# Patient Record
Sex: FEMALE | Race: WHITE | HISPANIC OR LATINO | URBAN - METROPOLITAN AREA
[De-identification: names, ages, dates, MRNs, and addresses within clinical notes are randomized per-mention and may not be internally consistent; named-entity substitution may affect disease eponyms.]

---

## 2019-05-10 ENCOUNTER — INPATIENT (INPATIENT)
Facility: HOSPITAL | Age: 39
LOS: 1 days | Discharge: HOME | DRG: 390 | End: 2019-05-12
Attending: SPECIALIST | Admitting: SPECIALIST
Payer: COMMERCIAL

## 2019-05-10 VITALS
HEIGHT: 65 IN | DIASTOLIC BLOOD PRESSURE: 64 MMHG | WEIGHT: 139.99 LBS | SYSTOLIC BLOOD PRESSURE: 100 MMHG | OXYGEN SATURATION: 100 % | RESPIRATION RATE: 18 BRPM | TEMPERATURE: 99 F | HEART RATE: 99 BPM

## 2019-05-10 DIAGNOSIS — Z90.49 ACQUIRED ABSENCE OF OTHER SPECIFIED PARTS OF DIGESTIVE TRACT: Chronic | ICD-10-CM

## 2019-05-10 DIAGNOSIS — K56.609 UNSPECIFIED INTESTINAL OBSTRUCTION, UNSPECIFIED AS TO PARTIAL VERSUS COMPLETE OBSTRUCTION: ICD-10-CM

## 2019-05-10 LAB
ALBUMIN SERPL ELPH-MCNC: 3.5 G/DL — SIGNIFICANT CHANGE UP (ref 3.3–5)
ALP SERPL-CCNC: 63 U/L — SIGNIFICANT CHANGE UP (ref 40–120)
ALT FLD-CCNC: 18 U/L — SIGNIFICANT CHANGE UP (ref 12–78)
ANION GAP SERPL CALC-SCNC: 9 MMOL/L — SIGNIFICANT CHANGE UP (ref 5–17)
AST SERPL-CCNC: 15 U/L — SIGNIFICANT CHANGE UP (ref 15–37)
BASOPHILS # BLD AUTO: 0.03 K/UL — SIGNIFICANT CHANGE UP (ref 0–0.2)
BASOPHILS NFR BLD AUTO: 0.2 % — SIGNIFICANT CHANGE UP (ref 0–2)
BILIRUB SERPL-MCNC: 0.6 MG/DL — SIGNIFICANT CHANGE UP (ref 0.2–1.2)
BUN SERPL-MCNC: 12 MG/DL — SIGNIFICANT CHANGE UP (ref 7–23)
CALCIUM SERPL-MCNC: 8.1 MG/DL — LOW (ref 8.5–10.1)
CHLORIDE SERPL-SCNC: 112 MMOL/L — HIGH (ref 96–108)
CO2 SERPL-SCNC: 20 MMOL/L — LOW (ref 22–31)
CREAT SERPL-MCNC: 0.65 MG/DL — SIGNIFICANT CHANGE UP (ref 0.5–1.3)
EOSINOPHIL # BLD AUTO: 0.08 K/UL — SIGNIFICANT CHANGE UP (ref 0–0.5)
EOSINOPHIL NFR BLD AUTO: 0.6 % — SIGNIFICANT CHANGE UP (ref 0–6)
GLUCOSE SERPL-MCNC: 112 MG/DL — HIGH (ref 70–99)
HCG SERPL-ACNC: <1 MIU/ML — SIGNIFICANT CHANGE UP
HCT VFR BLD CALC: 41.1 % — SIGNIFICANT CHANGE UP (ref 34.5–45)
HGB BLD-MCNC: 13 G/DL — SIGNIFICANT CHANGE UP (ref 11.5–15.5)
IMM GRANULOCYTES NFR BLD AUTO: 0.3 % — SIGNIFICANT CHANGE UP (ref 0–1.5)
LACTATE SERPL-SCNC: 1.4 MMOL/L — SIGNIFICANT CHANGE UP (ref 0.7–2)
LIDOCAIN IGE QN: 93 U/L — SIGNIFICANT CHANGE UP (ref 73–393)
LYMPHOCYTES # BLD AUTO: 0.67 K/UL — LOW (ref 1–3.3)
LYMPHOCYTES # BLD AUTO: 5.1 % — LOW (ref 13–44)
MCHC RBC-ENTMCNC: 26 PG — LOW (ref 27–34)
MCHC RBC-ENTMCNC: 31.6 GM/DL — LOW (ref 32–36)
MCV RBC AUTO: 82.2 FL — SIGNIFICANT CHANGE UP (ref 80–100)
MONOCYTES # BLD AUTO: 0.7 K/UL — SIGNIFICANT CHANGE UP (ref 0–0.9)
MONOCYTES NFR BLD AUTO: 5.3 % — SIGNIFICANT CHANGE UP (ref 2–14)
NEUTROPHILS # BLD AUTO: 11.67 K/UL — HIGH (ref 1.8–7.4)
NEUTROPHILS NFR BLD AUTO: 88.5 % — HIGH (ref 43–77)
NRBC # BLD: 0 /100 WBCS — SIGNIFICANT CHANGE UP (ref 0–0)
PLATELET # BLD AUTO: 438 K/UL — HIGH (ref 150–400)
POTASSIUM SERPL-MCNC: 3.9 MMOL/L — SIGNIFICANT CHANGE UP (ref 3.5–5.3)
POTASSIUM SERPL-SCNC: 3.9 MMOL/L — SIGNIFICANT CHANGE UP (ref 3.5–5.3)
PROT SERPL-MCNC: 7.2 G/DL — SIGNIFICANT CHANGE UP (ref 6–8.3)
RBC # BLD: 5 M/UL — SIGNIFICANT CHANGE UP (ref 3.8–5.2)
RBC # FLD: 14.6 % — HIGH (ref 10.3–14.5)
SODIUM SERPL-SCNC: 141 MMOL/L — SIGNIFICANT CHANGE UP (ref 135–145)
WBC # BLD: 13.19 K/UL — HIGH (ref 3.8–10.5)
WBC # FLD AUTO: 13.19 K/UL — HIGH (ref 3.8–10.5)

## 2019-05-10 PROCEDURE — 71045 X-RAY EXAM CHEST 1 VIEW: CPT | Mod: 26

## 2019-05-10 PROCEDURE — 74176 CT ABD & PELVIS W/O CONTRAST: CPT | Mod: 26

## 2019-05-10 PROCEDURE — 99285 EMERGENCY DEPT VISIT HI MDM: CPT

## 2019-05-10 RX ORDER — ONDANSETRON 8 MG/1
4 TABLET, FILM COATED ORAL ONCE
Refills: 0 | Status: COMPLETED | OUTPATIENT
Start: 2019-05-10 | End: 2019-05-10

## 2019-05-10 RX ORDER — SODIUM CHLORIDE 9 MG/ML
1000 INJECTION INTRAMUSCULAR; INTRAVENOUS; SUBCUTANEOUS
Refills: 0 | Status: DISCONTINUED | OUTPATIENT
Start: 2019-05-10 | End: 2019-05-11

## 2019-05-10 RX ORDER — KETOROLAC TROMETHAMINE 30 MG/ML
30 SYRINGE (ML) INJECTION ONCE
Refills: 0 | Status: DISCONTINUED | OUTPATIENT
Start: 2019-05-10 | End: 2019-05-10

## 2019-05-10 RX ORDER — HYDROMORPHONE HYDROCHLORIDE 2 MG/ML
0.5 INJECTION INTRAMUSCULAR; INTRAVENOUS; SUBCUTANEOUS ONCE
Refills: 0 | Status: DISCONTINUED | OUTPATIENT
Start: 2019-05-10 | End: 2019-05-10

## 2019-05-10 RX ORDER — FAMOTIDINE 10 MG/ML
20 INJECTION INTRAVENOUS ONCE
Refills: 0 | Status: COMPLETED | OUTPATIENT
Start: 2019-05-10 | End: 2019-05-10

## 2019-05-10 RX ORDER — SODIUM CHLORIDE 9 MG/ML
1000 INJECTION INTRAMUSCULAR; INTRAVENOUS; SUBCUTANEOUS ONCE
Refills: 0 | Status: COMPLETED | OUTPATIENT
Start: 2019-05-10 | End: 2019-05-10

## 2019-05-10 RX ORDER — SODIUM CHLORIDE 9 MG/ML
3 INJECTION INTRAMUSCULAR; INTRAVENOUS; SUBCUTANEOUS ONCE
Refills: 0 | Status: COMPLETED | OUTPATIENT
Start: 2019-05-10 | End: 2019-05-10

## 2019-05-10 RX ORDER — KETOROLAC TROMETHAMINE 30 MG/ML
30 SYRINGE (ML) INJECTION EVERY 6 HOURS
Refills: 0 | Status: DISCONTINUED | OUTPATIENT
Start: 2019-05-10 | End: 2019-05-12

## 2019-05-10 RX ADMIN — HYDROMORPHONE HYDROCHLORIDE 0.5 MILLIGRAM(S): 2 INJECTION INTRAMUSCULAR; INTRAVENOUS; SUBCUTANEOUS at 22:00

## 2019-05-10 RX ADMIN — SODIUM CHLORIDE 1000 MILLILITER(S): 9 INJECTION INTRAMUSCULAR; INTRAVENOUS; SUBCUTANEOUS at 22:00

## 2019-05-10 RX ADMIN — HYDROMORPHONE HYDROCHLORIDE 0.5 MILLIGRAM(S): 2 INJECTION INTRAMUSCULAR; INTRAVENOUS; SUBCUTANEOUS at 20:20

## 2019-05-10 RX ADMIN — Medication 30 MILLIGRAM(S): at 23:57

## 2019-05-10 RX ADMIN — FAMOTIDINE 20 MILLIGRAM(S): 10 INJECTION INTRAVENOUS at 20:20

## 2019-05-10 RX ADMIN — SODIUM CHLORIDE 1000 MILLILITER(S): 9 INJECTION INTRAMUSCULAR; INTRAVENOUS; SUBCUTANEOUS at 20:20

## 2019-05-10 RX ADMIN — SODIUM CHLORIDE 3 MILLILITER(S): 9 INJECTION INTRAMUSCULAR; INTRAVENOUS; SUBCUTANEOUS at 20:28

## 2019-05-10 RX ADMIN — ONDANSETRON 4 MILLIGRAM(S): 8 TABLET, FILM COATED ORAL at 20:20

## 2019-05-10 RX ADMIN — HYDROMORPHONE HYDROCHLORIDE 0.5 MILLIGRAM(S): 2 INJECTION INTRAMUSCULAR; INTRAVENOUS; SUBCUTANEOUS at 20:28

## 2019-05-10 RX ADMIN — HYDROMORPHONE HYDROCHLORIDE 0.5 MILLIGRAM(S): 2 INJECTION INTRAMUSCULAR; INTRAVENOUS; SUBCUTANEOUS at 20:45

## 2019-05-10 NOTE — ED ADULT NURSE NOTE - NSIMPLEMENTINTERV_GEN_ALL_ED
Implemented All Universal Safety Interventions:  Shaw to call system. Call bell, personal items and telephone within reach. Instruct patient to call for assistance. Room bathroom lighting operational. Non-slip footwear when patient is off stretcher. Physically safe environment: no spills, clutter or unnecessary equipment. Stretcher in lowest position, wheels locked, appropriate side rails in place.

## 2019-05-10 NOTE — ED PROVIDER NOTE - OBJECTIVE STATEMENT
pt is a 38yo female with no significant pmhx c/o abd pain x today. pt reports epigastric pain radiating to back with n/v and chills. pt reports she did not take anything for symptoms. pt hx of cholecystectomy. pt denies fever, cp, sob

## 2019-05-10 NOTE — ED PROVIDER NOTE - CLINICAL SUMMARY MEDICAL DECISION MAKING FREE TEXT BOX
pt with abd pain + guarding, n/v. will do labs, lactate, ivf, pain control, zofran, ct abd/pelvis to r/o obstruction.

## 2019-05-10 NOTE — ED ADULT NURSE NOTE - OBJECTIVE STATEMENT
Pt reports sudden onset of upper middle abdominal pain ever since eating pasta with sauce. States its a 10/10 pain and has nausea and vomiting.
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
## 2019-05-11 DIAGNOSIS — K56.609 UNSPECIFIED INTESTINAL OBSTRUCTION, UNSPECIFIED AS TO PARTIAL VERSUS COMPLETE OBSTRUCTION: ICD-10-CM

## 2019-05-11 LAB
BASOPHILS # BLD AUTO: 0 K/UL — SIGNIFICANT CHANGE UP (ref 0–0.2)
BASOPHILS NFR BLD AUTO: 0 % — SIGNIFICANT CHANGE UP (ref 0–2)
EOSINOPHIL # BLD AUTO: 0 K/UL — SIGNIFICANT CHANGE UP (ref 0–0.5)
EOSINOPHIL NFR BLD AUTO: 0 % — SIGNIFICANT CHANGE UP (ref 0–6)
HCT VFR BLD CALC: 36.2 % — SIGNIFICANT CHANGE UP (ref 34.5–45)
HGB BLD-MCNC: 11.7 G/DL — SIGNIFICANT CHANGE UP (ref 11.5–15.5)
LYMPHOCYTES # BLD AUTO: 0.49 K/UL — LOW (ref 1–3.3)
LYMPHOCYTES # BLD AUTO: 8 % — LOW (ref 13–44)
MCHC RBC-ENTMCNC: 26.6 PG — LOW (ref 27–34)
MCHC RBC-ENTMCNC: 32.3 GM/DL — SIGNIFICANT CHANGE UP (ref 32–36)
MCV RBC AUTO: 82.3 FL — SIGNIFICANT CHANGE UP (ref 80–100)
MONOCYTES # BLD AUTO: 0.37 K/UL — SIGNIFICANT CHANGE UP (ref 0–0.9)
MONOCYTES NFR BLD AUTO: 6 % — SIGNIFICANT CHANGE UP (ref 2–14)
NEUTROPHILS # BLD AUTO: 5.27 K/UL — SIGNIFICANT CHANGE UP (ref 1.8–7.4)
NEUTROPHILS NFR BLD AUTO: 75 % — SIGNIFICANT CHANGE UP (ref 43–77)
NRBC # BLD: SIGNIFICANT CHANGE UP /100 WBCS (ref 0–0)
PLATELET # BLD AUTO: 395 K/UL — SIGNIFICANT CHANGE UP (ref 150–400)
RBC # BLD: 4.4 M/UL — SIGNIFICANT CHANGE UP (ref 3.8–5.2)
RBC # FLD: 14.7 % — HIGH (ref 10.3–14.5)
WBC # BLD: 6.13 K/UL — SIGNIFICANT CHANGE UP (ref 3.8–10.5)
WBC # FLD AUTO: 6.13 K/UL — SIGNIFICANT CHANGE UP (ref 3.8–10.5)

## 2019-05-11 PROCEDURE — 74019 RADEX ABDOMEN 2 VIEWS: CPT | Mod: 26

## 2019-05-11 RX ORDER — HYDROMORPHONE HYDROCHLORIDE 2 MG/ML
1 INJECTION INTRAMUSCULAR; INTRAVENOUS; SUBCUTANEOUS EVERY 4 HOURS
Refills: 0 | Status: DISCONTINUED | OUTPATIENT
Start: 2019-05-11 | End: 2019-05-11

## 2019-05-11 RX ORDER — ACETAMINOPHEN 500 MG
650 TABLET ORAL ONCE
Refills: 0 | Status: COMPLETED | OUTPATIENT
Start: 2019-05-11 | End: 2019-05-11

## 2019-05-11 RX ORDER — ONDANSETRON 8 MG/1
4 TABLET, FILM COATED ORAL ONCE
Refills: 0 | Status: COMPLETED | OUTPATIENT
Start: 2019-05-11 | End: 2019-05-11

## 2019-05-11 RX ADMIN — Medication 30 MILLIGRAM(S): at 00:21

## 2019-05-11 RX ADMIN — HYDROMORPHONE HYDROCHLORIDE 1 MILLIGRAM(S): 2 INJECTION INTRAMUSCULAR; INTRAVENOUS; SUBCUTANEOUS at 01:52

## 2019-05-11 RX ADMIN — SODIUM CHLORIDE 100 MILLILITER(S): 9 INJECTION INTRAMUSCULAR; INTRAVENOUS; SUBCUTANEOUS at 01:04

## 2019-05-11 RX ADMIN — Medication 30 MILLIGRAM(S): at 22:44

## 2019-05-11 RX ADMIN — Medication 30 MILLIGRAM(S): at 23:00

## 2019-05-11 RX ADMIN — ONDANSETRON 4 MILLIGRAM(S): 8 TABLET, FILM COATED ORAL at 09:21

## 2019-05-11 RX ADMIN — Medication 30 MILLIGRAM(S): at 16:44

## 2019-05-11 RX ADMIN — Medication 650 MILLIGRAM(S): at 22:00

## 2019-05-11 RX ADMIN — Medication 30 MILLIGRAM(S): at 17:10

## 2019-05-11 RX ADMIN — Medication 650 MILLIGRAM(S): at 21:39

## 2019-05-11 RX ADMIN — HYDROMORPHONE HYDROCHLORIDE 1 MILLIGRAM(S): 2 INJECTION INTRAMUSCULAR; INTRAVENOUS; SUBCUTANEOUS at 02:27

## 2019-05-11 RX ADMIN — SODIUM CHLORIDE 100 MILLILITER(S): 9 INJECTION INTRAMUSCULAR; INTRAVENOUS; SUBCUTANEOUS at 11:04

## 2019-05-11 RX ADMIN — ONDANSETRON 4 MILLIGRAM(S): 8 TABLET, FILM COATED ORAL at 02:11

## 2019-05-11 NOTE — PROVIDER CONTACT NOTE (OTHER) - SITUATION
pt states had a loose stool, passing flatus, wants jello or something, getting a headache from hunger
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
## 2019-05-11 NOTE — PROGRESS NOTE ADULT - SUBJECTIVE AND OBJECTIVE BOX
Afeb VSS  Much less pain, now 2/10  Had nausea earlier this AM  No flatus but Abd no longer distended  Abd: mild diffuse tenderness with no guarding  AXR: paucity of gas, gas in colon  P: start clears later today or tomorrow AM if continues to improve and bowel function returns

## 2019-05-11 NOTE — PROVIDER CONTACT NOTE (OTHER) - ACTION/TREATMENT ORDERED:
Md aware coming to assess. No new orders given at this time.
d/c ivf, start clears
one dose of Zofran oral ordered,

## 2019-05-11 NOTE — PATIENT PROFILE ADULT - VISION (WITH CORRECTIVE LENSES IF THE PATIENT USUALLY WEARS THEM):
pt state"  wear glasses  for driving and reading"/Normal vision: sees adequately in most situations; can see medication labels, newsprint

## 2019-05-11 NOTE — H&P ADULT - HISTORY OF PRESENT ILLNESS
Pt is a 39 year old woman who had onset at 7 PM of severe abdominal pain and vomited many times.  At this time she has no pain and nausea but had not vomited for several hours.  She has had same symptoms but milder about once a year for about 10 years.  Never had CT before and has never been hospitalized.  She had an EGD in past and was told it was abnormal but she does not know in what way/  She takes prilosec for GERD

## 2019-05-12 ENCOUNTER — TRANSCRIPTION ENCOUNTER (OUTPATIENT)
Age: 39
End: 2019-05-12

## 2019-05-12 VITALS
DIASTOLIC BLOOD PRESSURE: 68 MMHG | SYSTOLIC BLOOD PRESSURE: 104 MMHG | OXYGEN SATURATION: 100 % | TEMPERATURE: 98 F | HEART RATE: 75 BPM | RESPIRATION RATE: 16 BRPM

## 2019-05-12 PROCEDURE — 96374 THER/PROPH/DIAG INJ IV PUSH: CPT

## 2019-05-12 PROCEDURE — 83690 ASSAY OF LIPASE: CPT

## 2019-05-12 PROCEDURE — 74176 CT ABD & PELVIS W/O CONTRAST: CPT

## 2019-05-12 PROCEDURE — 83605 ASSAY OF LACTIC ACID: CPT

## 2019-05-12 PROCEDURE — 85027 COMPLETE CBC AUTOMATED: CPT

## 2019-05-12 PROCEDURE — 99285 EMERGENCY DEPT VISIT HI MDM: CPT | Mod: 25

## 2019-05-12 PROCEDURE — 36415 COLL VENOUS BLD VENIPUNCTURE: CPT

## 2019-05-12 PROCEDURE — 96375 TX/PRO/DX INJ NEW DRUG ADDON: CPT

## 2019-05-12 PROCEDURE — 84702 CHORIONIC GONADOTROPIN TEST: CPT

## 2019-05-12 PROCEDURE — 96376 TX/PRO/DX INJ SAME DRUG ADON: CPT

## 2019-05-12 PROCEDURE — 71045 X-RAY EXAM CHEST 1 VIEW: CPT

## 2019-05-12 PROCEDURE — 80053 COMPREHEN METABOLIC PANEL: CPT

## 2019-05-12 PROCEDURE — 74019 RADEX ABDOMEN 2 VIEWS: CPT

## 2019-05-12 RX ADMIN — Medication 30 MILLIGRAM(S): at 13:03

## 2019-05-12 RX ADMIN — Medication 30 MILLIGRAM(S): at 14:03

## 2019-05-12 NOTE — PROGRESS NOTE ADULT - SUBJECTIVE AND OBJECTIVE BOX
Afeb VS wnl  Had BM x 2  Tolerating clear liquids  Abd: nontender, nondistended  P: discharge home; will call me if symptoms return

## 2019-05-12 NOTE — DISCHARGE NOTE PROVIDER - CARE PROVIDER_API CALL
Can Jacques)  [employer] [facility]  [address], [address]  [place] [address]  Phone: [phone]  Fax: [phone]  Follow Up Time:

## 2019-05-12 NOTE — DISCHARGE NOTE PROVIDER - NSDCACTIVITY_GEN_ALL_CORE
Stairs allowed/Showering allowed/Walking - Indoors allowed/Sex allowed/Driving allowed/Walking - Outdoors allowed/No restrictions/Return to Work/School allowed/Bathing allowed

## 2020-01-30 ENCOUNTER — APPOINTMENT (OUTPATIENT)
Dept: OBGYN | Facility: CLINIC | Age: 40
End: 2020-01-30
Payer: COMMERCIAL

## 2020-01-30 VITALS
BODY MASS INDEX: 24.16 KG/M2 | SYSTOLIC BLOOD PRESSURE: 128 MMHG | DIASTOLIC BLOOD PRESSURE: 68 MMHG | HEIGHT: 65 IN | WEIGHT: 145 LBS

## 2020-01-30 DIAGNOSIS — Z86.018 PERSONAL HISTORY OF OTHER BENIGN NEOPLASM: ICD-10-CM

## 2020-01-30 DIAGNOSIS — Z01.419 ENCOUNTER FOR GYNECOLOGICAL EXAMINATION (GENERAL) (ROUTINE) W/OUT ABNORMAL FINDINGS: ICD-10-CM

## 2020-01-30 PROCEDURE — 36415 COLL VENOUS BLD VENIPUNCTURE: CPT

## 2020-01-30 PROCEDURE — 99386 PREV VISIT NEW AGE 40-64: CPT

## 2020-01-30 PROCEDURE — 99203 OFFICE O/P NEW LOW 30 MIN: CPT | Mod: 25

## 2020-01-30 RX ORDER — IBUPROFEN 800 MG/1
TABLET, FILM COATED ORAL
Refills: 0 | Status: ACTIVE | COMMUNITY

## 2020-01-30 RX ORDER — CHLORHEXIDINE GLUCONATE 4 %
325 (65 FE) LIQUID (ML) TOPICAL
Refills: 0 | Status: ACTIVE | COMMUNITY

## 2020-01-30 NOTE — CHIEF COMPLAINT
[Initial Visit] : initial GYN visit [FreeTextEntry1] : Pt c/o painful periods - started long time ago but getting worse since last year.  Has pain down legs, takes 500mg ibuprofen - with some relief.  Comes right back.  Periods are very heavy - goes through pants - started last year.  Put herself on iron.  Had a blood count which was low but she doesn’t know what it was.  \par Has tried birth control and thinks she is allergic - gets lightheaded and nauseas. \abbey One of her co workers had novasure ablation and pt is interested in that

## 2020-01-30 NOTE — REVIEW OF SYSTEMS
[Nl] : Integumentary [FreeTextEntry1] : has blood clots on legs family is getting worried about - not bruises - hasn’t seen primary care

## 2020-01-31 LAB
BASOPHILS # BLD AUTO: 0.06 K/UL
BASOPHILS NFR BLD AUTO: 0.7 %
EOSINOPHIL # BLD AUTO: 0.07 K/UL
EOSINOPHIL NFR BLD AUTO: 0.8 %
HCT VFR BLD CALC: 38.4 %
HGB BLD-MCNC: 11.5 G/DL
IMM GRANULOCYTES NFR BLD AUTO: 0.2 %
LYMPHOCYTES # BLD AUTO: 2.66 K/UL
LYMPHOCYTES NFR BLD AUTO: 31.9 %
MAN DIFF?: NORMAL
MCHC RBC-ENTMCNC: 26.2 PG
MCHC RBC-ENTMCNC: 29.9 GM/DL
MCV RBC AUTO: 87.5 FL
MONOCYTES # BLD AUTO: 0.75 K/UL
MONOCYTES NFR BLD AUTO: 9 %
NEUTROPHILS # BLD AUTO: 4.78 K/UL
NEUTROPHILS NFR BLD AUTO: 57.4 %
PLATELET # BLD AUTO: 453 K/UL
RBC # BLD: 4.39 M/UL
RBC # FLD: 16.2 %
T3FREE SERPL-MCNC: 2.9 PG/ML
T4 FREE SERPL-MCNC: 1.1 NG/DL
TSH SERPL-ACNC: 1.56 UIU/ML
WBC # FLD AUTO: 8.34 K/UL

## 2020-02-03 LAB — HPV HIGH+LOW RISK DNA PNL CVX: NOT DETECTED

## 2020-02-06 LAB — CYTOLOGY CVX/VAG DOC THIN PREP: NORMAL

## 2020-02-24 ENCOUNTER — APPOINTMENT (OUTPATIENT)
Dept: OBGYN | Facility: CLINIC | Age: 40
End: 2020-02-24

## 2020-04-25 ENCOUNTER — MESSAGE (OUTPATIENT)
Age: 40
End: 2020-04-25

## 2020-05-16 ENCOUNTER — APPOINTMENT (OUTPATIENT)
Dept: DISASTER EMERGENCY | Facility: CLINIC | Age: 40
End: 2020-05-16

## 2020-05-28 LAB
SARS-COV-2 IGG SERPL IA-ACNC: <0.1 INDEX
SARS-COV-2 IGG SERPL QL IA: NEGATIVE

## 2020-06-10 ENCOUNTER — TRANSCRIPTION ENCOUNTER (OUTPATIENT)
Age: 40
End: 2020-06-10

## 2020-07-10 ENCOUNTER — OUTPATIENT (OUTPATIENT)
Dept: OUTPATIENT SERVICES | Facility: HOSPITAL | Age: 40
LOS: 1 days | End: 2020-07-10
Payer: COMMERCIAL

## 2020-07-10 ENCOUNTER — APPOINTMENT (OUTPATIENT)
Dept: MRI IMAGING | Facility: CLINIC | Age: 40
End: 2020-07-10

## 2020-07-10 DIAGNOSIS — Z00.8 ENCOUNTER FOR OTHER GENERAL EXAMINATION: ICD-10-CM

## 2020-07-10 DIAGNOSIS — Z90.49 ACQUIRED ABSENCE OF OTHER SPECIFIED PARTS OF DIGESTIVE TRACT: Chronic | ICD-10-CM

## 2020-07-10 PROCEDURE — 72141 MRI NECK SPINE W/O DYE: CPT | Mod: 26

## 2020-07-10 PROCEDURE — 72141 MRI NECK SPINE W/O DYE: CPT

## 2020-08-03 ENCOUNTER — APPOINTMENT (OUTPATIENT)
Dept: ORTHOPEDIC SURGERY | Facility: CLINIC | Age: 40
End: 2020-08-03
Payer: COMMERCIAL

## 2020-08-03 VITALS
BODY MASS INDEX: 24.99 KG/M2 | TEMPERATURE: 98 F | SYSTOLIC BLOOD PRESSURE: 120 MMHG | HEIGHT: 65 IN | HEART RATE: 82 BPM | WEIGHT: 150 LBS | DIASTOLIC BLOOD PRESSURE: 77 MMHG

## 2020-08-03 PROCEDURE — 99204 OFFICE O/P NEW MOD 45 MIN: CPT

## 2020-08-04 ENCOUNTER — APPOINTMENT (OUTPATIENT)
Dept: GASTROENTEROLOGY | Facility: CLINIC | Age: 40
End: 2020-08-04
Payer: COMMERCIAL

## 2020-08-11 ENCOUNTER — APPOINTMENT (OUTPATIENT)
Dept: GASTROENTEROLOGY | Facility: CLINIC | Age: 40
End: 2020-08-11
Payer: COMMERCIAL

## 2020-08-11 VITALS
HEART RATE: 88 BPM | BODY MASS INDEX: 24.99 KG/M2 | DIASTOLIC BLOOD PRESSURE: 77 MMHG | HEIGHT: 65 IN | RESPIRATION RATE: 14 BRPM | OXYGEN SATURATION: 97 % | SYSTOLIC BLOOD PRESSURE: 127 MMHG | WEIGHT: 150 LBS

## 2020-08-11 DIAGNOSIS — N92.0 EXCESSIVE AND FREQUENT MENSTRUATION WITH REGULAR CYCLE: ICD-10-CM

## 2020-08-11 DIAGNOSIS — Z78.9 OTHER SPECIFIED HEALTH STATUS: ICD-10-CM

## 2020-08-11 PROCEDURE — 99204 OFFICE O/P NEW MOD 45 MIN: CPT

## 2020-08-12 PROBLEM — N92.0 HEAVY MENSES: Status: ACTIVE | Noted: 2020-01-30

## 2020-08-12 PROBLEM — Z78.9 NON-SMOKER: Status: ACTIVE | Noted: 2020-01-30

## 2020-08-12 NOTE — PHYSICAL EXAM
[General Appearance - Alert] : alert [General Appearance - In No Acute Distress] : in no acute distress [Sclera] : the sclera and conjunctiva were normal [Outer Ear] : the ears and nose were normal in appearance [Oropharynx] : the oropharynx was normal [Neck Appearance] : the appearance of the neck was normal [Neck Cervical Mass (___cm)] : no neck mass was observed [Jugular Venous Distention Increased] : there was no jugular-venous distention [Thyroid Diffuse Enlargement] : the thyroid was not enlarged [Thyroid Nodule] : there were no palpable thyroid nodules [Auscultation Breath Sounds / Voice Sounds] : lungs were clear to auscultation bilaterally [Heart Rate And Rhythm] : heart rate was normal and rhythm regular [Heart Sounds] : normal S1 and S2 [Heart Sounds Gallop] : no gallops [Murmurs] : no murmurs [Heart Sounds Pericardial Friction Rub] : no pericardial rub [Full Pulse] : the pedal pulses are present [Edema] : there was no peripheral edema [Bowel Sounds] : normal bowel sounds [Abdomen Soft] : soft [Abdomen Mass (___ Cm)] : no abdominal mass palpated [Epigastric] : in the epigastric area [Abnormal Walk] : normal gait [Nail Clubbing] : no clubbing  or cyanosis of the fingernails [Musculoskeletal - Swelling] : no joint swelling seen [Motor Tone] : muscle strength and tone were normal [Skin Color & Pigmentation] : normal skin color and pigmentation [Skin Turgor] : normal skin turgor [] : no rash

## 2020-08-12 NOTE — REVIEW OF SYSTEMS
[Feeling Poorly] : feeling poorly [Feeling Tired] : feeling tired [Abdominal Pain] : abdominal pain [Vomiting] : vomiting [Diarrhea] : diarrhea [Arthralgias] : arthralgias [Joint Pain] : joint pain [Negative] : Heme/Lymph
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
## 2020-08-13 LAB
ALBUMIN SERPL ELPH-MCNC: 4.4 G/DL
ALP BLD-CCNC: 60 U/L
ALT SERPL-CCNC: 9 U/L
ANION GAP SERPL CALC-SCNC: 16 MMOL/L
AST SERPL-CCNC: 17 U/L
BASOPHILS # BLD AUTO: 0.06 K/UL
BASOPHILS NFR BLD AUTO: 0.7 %
BILIRUB DIRECT SERPL-MCNC: 0 MG/DL
BILIRUB SERPL-MCNC: <0.2 MG/DL
BUN SERPL-MCNC: 8 MG/DL
CALCIUM SERPL-MCNC: 9.3 MG/DL
CHLORIDE SERPL-SCNC: 105 MMOL/L
CO2 SERPL-SCNC: 18 MMOL/L
CREAT SERPL-MCNC: 0.64 MG/DL
CRP SERPL-MCNC: <0.1 MG/DL
EOSINOPHIL # BLD AUTO: 0.24 K/UL
EOSINOPHIL NFR BLD AUTO: 2.6 %
GLUCOSE SERPL-MCNC: 84 MG/DL
HCT VFR BLD CALC: 39.3 %
HGB BLD-MCNC: 11.5 G/DL
IGA SER QL IEP: 172 MG/DL
IMM GRANULOCYTES NFR BLD AUTO: 0.3 %
LPL SERPL-CCNC: 35 U/L
LYMPHOCYTES # BLD AUTO: 3.09 K/UL
LYMPHOCYTES NFR BLD AUTO: 33.6 %
MAN DIFF?: NORMAL
MCHC RBC-ENTMCNC: 25 PG
MCHC RBC-ENTMCNC: 29.3 GM/DL
MCV RBC AUTO: 85.4 FL
MONOCYTES # BLD AUTO: 0.78 K/UL
MONOCYTES NFR BLD AUTO: 8.5 %
NEUTROPHILS # BLD AUTO: 5.01 K/UL
NEUTROPHILS NFR BLD AUTO: 54.3 %
PLATELET # BLD AUTO: 497 K/UL
POTASSIUM SERPL-SCNC: 4.3 MMOL/L
PROT SERPL-MCNC: 7 G/DL
RBC # BLD: 4.6 M/UL
RBC # FLD: 15.1 %
SODIUM SERPL-SCNC: 139 MMOL/L
TTG IGA SER IA-ACNC: <1.2 U/ML
TTG IGA SER-ACNC: NEGATIVE
WBC # FLD AUTO: 9.21 K/UL

## 2020-08-17 LAB
ENDOMYSIUM IGA SER QL: NEGATIVE
ENDOMYSIUM IGA TITR SER: NORMAL

## 2020-08-19 ENCOUNTER — APPOINTMENT (OUTPATIENT)
Dept: GASTROENTEROLOGY | Facility: AMBULATORY MEDICAL SERVICES | Age: 40
End: 2020-08-19
Payer: COMMERCIAL

## 2020-08-19 ENCOUNTER — RESULT REVIEW (OUTPATIENT)
Age: 40
End: 2020-08-19

## 2020-08-19 PROCEDURE — 45380 COLONOSCOPY AND BIOPSY: CPT | Mod: 59

## 2020-08-19 PROCEDURE — 43239 EGD BIOPSY SINGLE/MULTIPLE: CPT | Mod: 59

## 2020-09-14 ENCOUNTER — APPOINTMENT (OUTPATIENT)
Dept: GASTROENTEROLOGY | Facility: CLINIC | Age: 40
End: 2020-09-14

## 2020-09-29 ENCOUNTER — TRANSCRIPTION ENCOUNTER (OUTPATIENT)
Age: 40
End: 2020-09-29

## 2020-10-08 ENCOUNTER — EMERGENCY (EMERGENCY)
Facility: HOSPITAL | Age: 40
LOS: 1 days | Discharge: HOME | End: 2020-10-08
Attending: EMERGENCY MEDICINE | Admitting: EMERGENCY MEDICINE
Payer: SELF-PAY

## 2020-10-08 VITALS
RESPIRATION RATE: 16 BRPM | TEMPERATURE: 98 F | DIASTOLIC BLOOD PRESSURE: 74 MMHG | OXYGEN SATURATION: 97 % | HEART RATE: 81 BPM | SYSTOLIC BLOOD PRESSURE: 113 MMHG

## 2020-10-08 VITALS
HEIGHT: 65 IN | RESPIRATION RATE: 14 BRPM | WEIGHT: 149.91 LBS | TEMPERATURE: 98 F | OXYGEN SATURATION: 100 % | DIASTOLIC BLOOD PRESSURE: 59 MMHG | HEART RATE: 76 BPM | SYSTOLIC BLOOD PRESSURE: 117 MMHG

## 2020-10-08 DIAGNOSIS — Z90.49 ACQUIRED ABSENCE OF OTHER SPECIFIED PARTS OF DIGESTIVE TRACT: Chronic | ICD-10-CM

## 2020-10-08 LAB — HCG UR QL: NEGATIVE — SIGNIFICANT CHANGE UP

## 2020-10-08 PROCEDURE — 72125 CT NECK SPINE W/O DYE: CPT | Mod: 26

## 2020-10-08 PROCEDURE — 81025 URINE PREGNANCY TEST: CPT

## 2020-10-08 PROCEDURE — 72131 CT LUMBAR SPINE W/O DYE: CPT

## 2020-10-08 PROCEDURE — 72125 CT NECK SPINE W/O DYE: CPT

## 2020-10-08 PROCEDURE — 99284 EMERGENCY DEPT VISIT MOD MDM: CPT | Mod: 25

## 2020-10-08 PROCEDURE — 72040 X-RAY EXAM NECK SPINE 2-3 VW: CPT

## 2020-10-08 PROCEDURE — 70450 CT HEAD/BRAIN W/O DYE: CPT

## 2020-10-08 PROCEDURE — 72040 X-RAY EXAM NECK SPINE 2-3 VW: CPT | Mod: 26

## 2020-10-08 PROCEDURE — 72131 CT LUMBAR SPINE W/O DYE: CPT | Mod: 26

## 2020-10-08 PROCEDURE — 70450 CT HEAD/BRAIN W/O DYE: CPT | Mod: 26

## 2020-10-08 PROCEDURE — 99283 EMERGENCY DEPT VISIT LOW MDM: CPT

## 2020-10-08 PROCEDURE — 72072 X-RAY EXAM THORAC SPINE 3VWS: CPT | Mod: 26

## 2020-10-08 PROCEDURE — 72072 X-RAY EXAM THORAC SPINE 3VWS: CPT

## 2020-10-08 RX ORDER — LIDOCAINE 4 G/100G
1 CREAM TOPICAL ONCE
Refills: 0 | Status: COMPLETED | OUTPATIENT
Start: 2020-10-08 | End: 2020-10-08

## 2020-10-08 RX ORDER — ACETAMINOPHEN 500 MG
650 TABLET ORAL ONCE
Refills: 0 | Status: COMPLETED | OUTPATIENT
Start: 2020-10-08 | End: 2020-10-08

## 2020-10-08 RX ORDER — CYCLOBENZAPRINE HYDROCHLORIDE 10 MG/1
1 TABLET, FILM COATED ORAL
Qty: 9 | Refills: 0
Start: 2020-10-08 | End: 2020-10-10

## 2020-10-08 RX ORDER — CYCLOBENZAPRINE HYDROCHLORIDE 10 MG/1
10 TABLET, FILM COATED ORAL ONCE
Refills: 0 | Status: COMPLETED | OUTPATIENT
Start: 2020-10-08 | End: 2020-10-08

## 2020-10-08 RX ADMIN — Medication 650 MILLIGRAM(S): at 12:59

## 2020-10-08 RX ADMIN — LIDOCAINE 1 PATCH: 4 CREAM TOPICAL at 11:51

## 2020-10-08 RX ADMIN — CYCLOBENZAPRINE HYDROCHLORIDE 10 MILLIGRAM(S): 10 TABLET, FILM COATED ORAL at 13:17

## 2020-10-08 RX ADMIN — Medication 650 MILLIGRAM(S): at 11:51
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
## 2020-10-08 NOTE — ED PROVIDER NOTE - CHPI ED SYMPTOMS NEG
no disorientation/no headache/no dizziness/no bruising/no difficulty bearing weight/no loss of consciousness
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
## 2020-10-08 NOTE — ED PROVIDER NOTE - NSFOLLOWUPINSTRUCTIONS_ED_ALL_ED_FT
follow up with your PMD  follow up with orthopedic  call tomorrow to arrange follow up  recommend over the counter lidocaine patch, tylenol as directed for pain  any concerns, condition worsens return to ED

## 2020-10-08 NOTE — ED PROVIDER NOTE - CARE PROVIDER_API CALL
Volodymyr Ware (DO)  Orthopaedic Surgery  [address] [place] [address]  Phone: [phone]  Fax: [phone]  Follow Up Time: 1-3 Days

## 2020-10-08 NOTE — ED ADULT TRIAGE NOTE - HEIGHT IN INCHES
[FreeTextEntry1] : 52 y/o woman present for 7 day history of respiratory infection which has been improving except for lingering sinus congestion.  5
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
## 2020-10-08 NOTE — ED PROVIDER NOTE - PATIENT PORTAL LINK FT
You can access the FollowMyHealth Patient Portal offered by [facility] by registering at the following website: [email or web address]. By joining [employer]’s FollowMyHealth portal, you will also be able to view your health information using other applications (apps) compatible with our system.

## 2020-10-08 NOTE — ED PROVIDER NOTE - NS CPE EDP MUSC THORACIC LOC
tenderness/left paraspinal thoracic spine pain, muscle spasm with from, nvi, no visible abnormalities

## 2020-10-08 NOTE — ED PROVIDER NOTE - PROGRESS NOTE DETAILS
patient resting comfortably, states feels better after meds,  results discussed,  no fx on ct, given name of ortho Dr Ware, call tomorrow to arrange follow up, rx flexeril sent to pharmacy, recommended otc lidocaine patch as directed and otc tylenol as directed for pain.  copy of results given, advised follow up with pmd, if any concerns return , condition worsens return to ED
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
## 2020-10-21 ENCOUNTER — APPOINTMENT (OUTPATIENT)
Dept: ORTHOPEDIC SURGERY | Facility: CLINIC | Age: 40
End: 2020-10-21
Payer: OTHER MISCELLANEOUS

## 2020-10-21 VITALS
HEART RATE: 96 BPM | WEIGHT: 145 LBS | DIASTOLIC BLOOD PRESSURE: 73 MMHG | BODY MASS INDEX: 24.16 KG/M2 | HEIGHT: 65 IN | TEMPERATURE: 97.7 F | SYSTOLIC BLOOD PRESSURE: 108 MMHG

## 2020-10-21 DIAGNOSIS — M54.2 CERVICALGIA: ICD-10-CM
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
## 2020-10-21 PROCEDURE — 99214 OFFICE O/P EST MOD 30 MIN: CPT

## 2020-12-01 NOTE — ED PROVIDER NOTE - CARDIAC, MLM
Health Maintenance Due   Topic Date Due    Shingles Vaccine (2 of 3) 01/08/2020    Influenza Vaccine (1) 08/01/2020     Updates were requested from care everywhere.  Chart was reviewed for overdue Proactive [facility] Encounters (MICHELLE) topics (CRS, Breast Cancer Screening, Eye exam)  Health Maintenance has been updated.  LINKS immunization registry triggered.  Immunizations were reconciled.      
Normal rate, regular rhythm.  Heart sounds S1, S2.  No murmurs, rubs or gallops.

## 2020-12-23 PROBLEM — Z01.419 ENCOUNTER FOR ANNUAL ROUTINE GYNECOLOGICAL EXAMINATION: Status: RESOLVED | Noted: 2020-01-30 | Resolved: 2020-12-23

## 2021-03-23 ENCOUNTER — RESULT REVIEW (OUTPATIENT)
Age: 41
End: 2021-03-23

## 2021-04-19 ENCOUNTER — APPOINTMENT (OUTPATIENT)
Dept: INTERNAL MEDICINE | Facility: CLINIC | Age: 41
End: 2021-04-19
Payer: COMMERCIAL

## 2021-04-19 DIAGNOSIS — R10.13 EPIGASTRIC PAIN: ICD-10-CM

## 2021-04-19 PROCEDURE — 36415 COLL VENOUS BLD VENIPUNCTURE: CPT

## 2021-04-19 PROCEDURE — 99072 ADDL SUPL MATRL&STAF TM PHE: CPT

## 2021-04-19 PROCEDURE — 99204 OFFICE O/P NEW MOD 45 MIN: CPT | Mod: 25

## 2021-04-19 RX ORDER — FERROUS GLUCONATE 324(37.5)
324 (37.5 FE) TABLET ORAL
Qty: 90 | Refills: 3 | Status: ACTIVE | COMMUNITY
Start: 2021-04-19 | End: 1900-01-01

## 2021-04-19 NOTE — HISTORY OF PRESENT ILLNESS
[FreeTextEntry1] : GI issues for years. . told had celiac- then told crohns disease. She has been hospitalized for stomach pain. recently and had EGD\par  EGD 8/2020- 8mmfundic ulcer\par  and gastritis diffusely.\par  normal colonoscopy\par  Biopsies suggested H. Pylori\par current symptoms- gets bloating and hard abdomen every day and acid inher throat .\par  also gets fatigue \par  She uses pepcid otc helps a little and sometimes 3 times daily\par . she has no appetite-, and gets full quickly. And if she overeats she has pain. \par  recently she was told that she was anemic. she tried taking liquid  burns her stomach. \par Tried prenatals and caused frreqent BM's\par She uses NSAIDS due to pinched nerve in neck-uses 800mg 2-3 x a week. \par

## 2021-04-19 NOTE — PHYSICAL EXAM
[General Appearance - Alert] : alert [General Appearance - In No Acute Distress] : in no acute distress [Outer Ear] : the ears and nose were normal in appearance [Heart Rate And Rhythm] : heart rate was normal and rhythm regular [Oropharynx] : the oropharynx was normal [Heart Sounds] : normal S1 and S2 [Heart Sounds Gallop] : no gallops [Murmurs] : no murmurs [Heart Sounds Pericardial Friction Rub] : no pericardial rub [Bowel Sounds] : normal bowel sounds [Abdomen Soft] : soft [Abdomen Tenderness] : non-tender [] : no hepato-splenomegaly [Abdomen Mass (___ Cm)] : no abdominal mass palpated [External Female Genitalia] : normal external genitalia [Urethral Meatus] : normal urethra [Urinary Bladder Findings] : the bladder was normal on palpation [Cervical Lymph Nodes Enlarged Posterior Bilaterally] : posterior cervical [Cervical Lymph Nodes Enlarged Anterior Bilaterally] : anterior cervical [Supraclavicular Lymph Nodes Enlarged Bilaterally] : supraclavicular [Axillary Lymph Nodes Enlarged Bilaterally] : axillary [Femoral Lymph Nodes Enlarged Bilaterally] : femoral [Inguinal Lymph Nodes Enlarged Bilaterally] : inguinal [No CVA Tenderness] : no ~M costovertebral angle tenderness [No Spinal Tenderness] : no spinal tenderness [Abnormal Walk] : normal gait [Nail Clubbing] : no clubbing  or cyanosis of the fingernails [Musculoskeletal - Swelling] : no joint swelling seen [Motor Tone] : muscle strength and tone were normal [Oriented To Time, Place, And Person] : oriented to person, place, and time [Affect] : the affect was normal [Impaired Insight] : insight and judgment were intact

## 2021-04-21 ENCOUNTER — APPOINTMENT (OUTPATIENT)
Dept: ULTRASOUND IMAGING | Facility: CLINIC | Age: 41
End: 2021-04-21
Payer: COMMERCIAL

## 2021-04-21 ENCOUNTER — APPOINTMENT (OUTPATIENT)
Dept: MAMMOGRAPHY | Facility: CLINIC | Age: 41
End: 2021-04-21
Payer: COMMERCIAL

## 2021-04-21 ENCOUNTER — OUTPATIENT (OUTPATIENT)
Dept: OUTPATIENT SERVICES | Facility: HOSPITAL | Age: 41
LOS: 1 days | End: 2021-04-21
Payer: COMMERCIAL

## 2021-04-21 DIAGNOSIS — N92.0 EXCESSIVE AND FREQUENT MENSTRUATION WITH REGULAR CYCLE: ICD-10-CM

## 2021-04-21 DIAGNOSIS — Z00.8 ENCOUNTER FOR OTHER GENERAL EXAMINATION: ICD-10-CM

## 2021-04-21 DIAGNOSIS — Z12.31 ENCOUNTER FOR SCREENING MAMMOGRAM FOR MALIGNANT NEOPLASM OF BREAST: ICD-10-CM

## 2021-04-21 DIAGNOSIS — Z90.49 ACQUIRED ABSENCE OF OTHER SPECIFIED PARTS OF DIGESTIVE TRACT: Chronic | ICD-10-CM

## 2021-04-21 DIAGNOSIS — D25.0 SUBMUCOUS LEIOMYOMA OF UTERUS: ICD-10-CM

## 2021-04-21 PROCEDURE — 76856 US EXAM PELVIC COMPLETE: CPT | Mod: 26

## 2021-04-21 PROCEDURE — 76641 ULTRASOUND BREAST COMPLETE: CPT | Mod: 26,50

## 2021-04-21 PROCEDURE — 77067 SCR MAMMO BI INCL CAD: CPT

## 2021-04-21 PROCEDURE — 77063 BREAST TOMOSYNTHESIS BI: CPT | Mod: 26

## 2021-04-21 PROCEDURE — 77067 SCR MAMMO BI INCL CAD: CPT | Mod: 26

## 2021-04-21 PROCEDURE — 76641 ULTRASOUND BREAST COMPLETE: CPT

## 2021-04-21 PROCEDURE — 76830 TRANSVAGINAL US NON-OB: CPT | Mod: 26

## 2021-04-21 PROCEDURE — 76856 US EXAM PELVIC COMPLETE: CPT

## 2021-04-21 PROCEDURE — 76830 TRANSVAGINAL US NON-OB: CPT

## 2021-04-21 PROCEDURE — 77063 BREAST TOMOSYNTHESIS BI: CPT

## 2021-04-24 ENCOUNTER — APPOINTMENT (OUTPATIENT)
Dept: ULTRASOUND IMAGING | Facility: CLINIC | Age: 41
End: 2021-04-24

## 2021-04-28 LAB
BASOPHILS # BLD AUTO: 0.06 K/UL
BASOPHILS NFR BLD AUTO: 0.8 %
EOSINOPHIL # BLD AUTO: 0.13 K/UL
EOSINOPHIL NFR BLD AUTO: 1.7 %
FERRITIN SERPL-MCNC: 15 NG/ML
FOLATE SERPL-MCNC: 14.7 NG/ML
HCT VFR BLD CALC: 34.8 %
HGB BLD-MCNC: 10.4 G/DL
IMM GRANULOCYTES NFR BLD AUTO: 0.1 %
IRON SATN MFR SERPL: 5 %
IRON SERPL-MCNC: 21 UG/DL
LYMPHOCYTES # BLD AUTO: 2.4 K/UL
LYMPHOCYTES NFR BLD AUTO: 31.9 %
MAN DIFF?: NORMAL
MCHC RBC-ENTMCNC: 24.5 PG
MCHC RBC-ENTMCNC: 29.9 GM/DL
MCV RBC AUTO: 81.9 FL
MONOCYTES # BLD AUTO: 0.72 K/UL
MONOCYTES NFR BLD AUTO: 9.6 %
NEUTROPHILS # BLD AUTO: 4.2 K/UL
NEUTROPHILS NFR BLD AUTO: 55.9 %
PLATELET # BLD AUTO: 524 K/UL
RBC # BLD: 4.25 M/UL
RBC # FLD: 19.9 %
TIBC SERPL-MCNC: 467 UG/DL
UIBC SERPL-MCNC: 445 UG/DL
UREA BREATH TEST QL: NEGATIVE
VIT B12 SERPL-MCNC: 530 PG/ML
WBC # FLD AUTO: 7.52 K/UL

## 2021-06-07 ENCOUNTER — APPOINTMENT (OUTPATIENT)
Dept: INTERNAL MEDICINE | Facility: AMBULATORY MEDICAL SERVICES | Age: 41
End: 2021-06-07
Payer: COMMERCIAL

## 2021-06-07 PROCEDURE — 43239 EGD BIOPSY SINGLE/MULTIPLE: CPT

## 2021-06-07 RX ORDER — CELECOXIB 100 MG/1
100 CAPSULE ORAL TWICE DAILY
Qty: 30 | Refills: 0 | Status: DISCONTINUED | COMMUNITY
Start: 2020-10-21 | End: 2021-06-07

## 2021-06-07 RX ORDER — BISMUTH SUBCITRATE POTASSIUM, METRONIDAZOLE, AND TETRACYCLINE HYDROCHLORIDE 140; 125; 125 MG/1; MG/1; MG/1
140-125-125 CAPSULE ORAL
Qty: 10 | Refills: 0 | Status: DISCONTINUED | COMMUNITY
Start: 2020-08-21 | End: 2021-06-07

## 2021-07-26 ENCOUNTER — NON-APPOINTMENT (OUTPATIENT)
Age: 41
End: 2021-07-26

## 2021-07-26 ENCOUNTER — APPOINTMENT (OUTPATIENT)
Dept: INTERNAL MEDICINE | Facility: CLINIC | Age: 41
End: 2021-07-26
Payer: COMMERCIAL

## 2021-07-26 VITALS
DIASTOLIC BLOOD PRESSURE: 71 MMHG | HEART RATE: 80 BPM | WEIGHT: 150 LBS | SYSTOLIC BLOOD PRESSURE: 116 MMHG | OXYGEN SATURATION: 98 % | HEIGHT: 65 IN | TEMPERATURE: 97.7 F | BODY MASS INDEX: 24.99 KG/M2

## 2021-07-26 DIAGNOSIS — E53.8 DEFICIENCY OF OTHER SPECIFIED B GROUP VITAMINS: ICD-10-CM

## 2021-07-26 DIAGNOSIS — E55.9 VITAMIN D DEFICIENCY, UNSPECIFIED: ICD-10-CM

## 2021-07-26 DIAGNOSIS — N94.6 DYSMENORRHEA, UNSPECIFIED: ICD-10-CM

## 2021-07-26 DIAGNOSIS — Z00.00 ENCOUNTER FOR GENERAL ADULT MEDICAL EXAMINATION W/OUT ABNORMAL FINDINGS: ICD-10-CM

## 2021-07-26 DIAGNOSIS — E07.9 DISORDER OF THYROID, UNSPECIFIED: ICD-10-CM

## 2021-07-26 DIAGNOSIS — R73.9 HYPERGLYCEMIA, UNSPECIFIED: ICD-10-CM

## 2021-07-26 LAB
BILIRUB UR QL STRIP: NEGATIVE
CLARITY UR: CLEAR
COLLECTION METHOD: NORMAL
GLUCOSE UR-MCNC: NEGATIVE
HCG UR QL: 1 EU/DL
HGB UR QL STRIP.AUTO: NORMAL
KETONES UR-MCNC: NEGATIVE
LEUKOCYTE ESTERASE UR QL STRIP: NORMAL
NITRITE UR QL STRIP: NEGATIVE
PH UR STRIP: 7.5
PROT UR STRIP-MCNC: NORMAL
SP GR UR STRIP: 1.02

## 2021-07-26 PROCEDURE — 36415 COLL VENOUS BLD VENIPUNCTURE: CPT

## 2021-07-26 PROCEDURE — 81003 URINALYSIS AUTO W/O SCOPE: CPT | Mod: QW

## 2021-07-26 PROCEDURE — 99072 ADDL SUPL MATRL&STAF TM PHE: CPT

## 2021-07-26 PROCEDURE — G0444 DEPRESSION SCREEN ANNUAL: CPT | Mod: NC,59

## 2021-07-26 PROCEDURE — G0442 ANNUAL ALCOHOL SCREEN 15 MIN: CPT | Mod: NC,59

## 2021-07-26 PROCEDURE — 99396 PREV VISIT EST AGE 40-64: CPT | Mod: 25

## 2021-07-26 PROCEDURE — 93000 ELECTROCARDIOGRAM COMPLETE: CPT | Mod: 59

## 2021-07-26 RX ORDER — METHOCARBAMOL 500 MG/1
500 TABLET, FILM COATED ORAL 3 TIMES DAILY
Qty: 30 | Refills: 0 | Status: DISCONTINUED | COMMUNITY
Start: 2020-10-21 | End: 2021-07-26

## 2021-07-26 RX ORDER — OMEPRAZOLE 20 MG/1
20 TABLET, DELAYED RELEASE ORAL
Qty: 90 | Refills: 0 | Status: DISCONTINUED | COMMUNITY
Start: 2020-08-19 | End: 2021-07-26

## 2021-07-26 NOTE — HEALTH RISK ASSESSMENT
[Very Good] : ~his/her~  mood as very good [No] : In the past 12 months have you used drugs other than those required for medical reasons? No [No falls in past year] : Patient reported no falls in the past year [0] : 2) Feeling down, depressed, or hopeless: Not at all (0) [Patient refused screening] : Patient refused screening [PHQ-2 Negative - No further assessment needed] : PHQ-2 Negative - No further assessment needed [No Retinopathy] : No retinopathy [Patient reported mammogram was normal] : Patient reported mammogram was normal [Patient reported PAP Smear was normal] : Patient reported PAP Smear was normal [HIV test declined] : HIV test declined normal... [Hepatitis C test declined] : Hepatitis C test declined [With Significant Other] : lives with significant other [Employed] : employed [] :  [# Of Children ___] : has [unfilled] children [Sexually Active] : sexually active [Feels Safe at Home] : Feels safe at home [Fully functional (bathing, dressing, toileting, transferring, walking, feeding)] : Fully functional (bathing, dressing, toileting, transferring, walking, feeding) [Fully functional (using the telephone, shopping, preparing meals, housekeeping, doing laundry, using] : Fully functional and needs no help or supervision to perform IADLs (using the telephone, shopping, preparing meals, housekeeping, doing laundry, using transportation, managing medications and managing finances) [Reports changes in vision] : Reports changes in vision [Smoke Detector] : smoke detector [Carbon Monoxide Detector] : carbon monoxide detector [Seat Belt] :  uses seat belt [Sunscreen] : uses sunscreen [] : No [Audit-CScore] : 0 [UQT6Tavas] : 0 [EyeExamDate] : 2021 [Change in mental status noted] : No change in mental status noted [Reports changes in hearing] : Reports no changes in hearing [Reports normal functional visual acuity (ie: able to read med bottle)] : Reports poor functional visual acuity.  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

## 2021-07-26 NOTE — HISTORY OF PRESENT ILLNESS
[FreeTextEntry1] : Stomach feeling better on pantoprazole.\par Problems falling asleep, uses z-quil.

## 2021-08-12 LAB
25(OH)D3 SERPL-MCNC: 23.8 NG/ML
ALBUMIN SERPL ELPH-MCNC: 4.3 G/DL
ALP BLD-CCNC: 69 U/L
ALT SERPL-CCNC: 7 U/L
ANION GAP SERPL CALC-SCNC: 13 MMOL/L
AST SERPL-CCNC: 12 U/L
BASOPHILS # BLD AUTO: 0.03 K/UL
BASOPHILS NFR BLD AUTO: 0.3 %
BILIRUB SERPL-MCNC: 0.4 MG/DL
BUN SERPL-MCNC: 7 MG/DL
CALCIUM SERPL-MCNC: 9.5 MG/DL
CHLORIDE SERPL-SCNC: 104 MMOL/L
CHOLEST SERPL-MCNC: 145 MG/DL
CO2 SERPL-SCNC: 22 MMOL/L
CREAT SERPL-MCNC: 0.74 MG/DL
EOSINOPHIL # BLD AUTO: 0.19 K/UL
EOSINOPHIL NFR BLD AUTO: 2 %
ESTIMATED AVERAGE GLUCOSE: 91 MG/DL
FERRITIN SERPL-MCNC: 26 NG/ML
GLUCOSE SERPL-MCNC: 87 MG/DL
HBA1C MFR BLD HPLC: 4.8 %
HCT VFR BLD CALC: 38 %
HDLC SERPL-MCNC: 52 MG/DL
HGB BLD-MCNC: 11.5 G/DL
IMM GRANULOCYTES NFR BLD AUTO: 0.5 %
IRON SATN MFR SERPL: 95 %
IRON SERPL-MCNC: 402 UG/DL
LDLC SERPL CALC-MCNC: 69 MG/DL
LYMPHOCYTES # BLD AUTO: 2.2 K/UL
LYMPHOCYTES NFR BLD AUTO: 23.3 %
MAN DIFF?: NORMAL
MCHC RBC-ENTMCNC: 25.9 PG
MCHC RBC-ENTMCNC: 30.3 GM/DL
MCV RBC AUTO: 85.6 FL
MONOCYTES # BLD AUTO: 1.02 K/UL
MONOCYTES NFR BLD AUTO: 10.8 %
NEUTROPHILS # BLD AUTO: 5.96 K/UL
NEUTROPHILS NFR BLD AUTO: 63.1 %
NONHDLC SERPL-MCNC: 94 MG/DL
PLATELET # BLD AUTO: 433 K/UL
POTASSIUM SERPL-SCNC: 4.4 MMOL/L
PROT SERPL-MCNC: 7 G/DL
RBC # BLD: 4.44 M/UL
RBC # FLD: 19.2 %
SODIUM SERPL-SCNC: 140 MMOL/L
T4 FREE SERPL-MCNC: 1.1 NG/DL
T4 SERPL-MCNC: 7 UG/DL
TIBC SERPL-MCNC: 425 UG/DL
TRIGL SERPL-MCNC: 121 MG/DL
TSH SERPL-ACNC: 1.25 UIU/ML
UIBC SERPL-MCNC: 23 UG/DL
VIT B12 SERPL-MCNC: 430 PG/ML
WBC # FLD AUTO: 9.45 K/UL
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
## 2021-10-11 ENCOUNTER — APPOINTMENT (OUTPATIENT)
Dept: INTERNAL MEDICINE | Facility: CLINIC | Age: 41
End: 2021-10-11

## 2021-10-17 ENCOUNTER — TRANSCRIPTION ENCOUNTER (OUTPATIENT)
Age: 41
End: 2021-10-17

## 2021-11-06 ENCOUNTER — TRANSCRIPTION ENCOUNTER (OUTPATIENT)
Age: 41
End: 2021-11-06

## 2022-01-12 ENCOUNTER — APPOINTMENT (OUTPATIENT)
Dept: OBGYN | Facility: CLINIC | Age: 42
End: 2022-01-12
Payer: COMMERCIAL

## 2022-01-12 ENCOUNTER — NON-APPOINTMENT (OUTPATIENT)
Age: 42
End: 2022-01-12

## 2022-01-12 VITALS
DIASTOLIC BLOOD PRESSURE: 70 MMHG | SYSTOLIC BLOOD PRESSURE: 130 MMHG | BODY MASS INDEX: 23.82 KG/M2 | WEIGHT: 143 LBS | HEART RATE: 78 BPM | OXYGEN SATURATION: 98 % | HEIGHT: 65 IN

## 2022-01-12 DIAGNOSIS — R10.2 PELVIC AND PERINEAL PAIN: ICD-10-CM

## 2022-01-12 DIAGNOSIS — Z01.419 ENCOUNTER FOR GYNECOLOGICAL EXAMINATION (GENERAL) (ROUTINE) W/OUT ABNORMAL FINDINGS: ICD-10-CM

## 2022-01-12 DIAGNOSIS — N93.9 ABNORMAL UTERINE AND VAGINAL BLEEDING, UNSPECIFIED: ICD-10-CM

## 2022-01-12 PROCEDURE — 99396 PREV VISIT EST AGE 40-64: CPT

## 2022-01-12 RX ORDER — FERROUS GLUCONATE 324(38)MG
324 (38 FE) TABLET ORAL
Qty: 30 | Refills: 0 | Status: DISCONTINUED | COMMUNITY
Start: 2021-04-19 | End: 2022-01-12

## 2022-01-19 LAB
APPEARANCE: CLEAR
BACTERIA UR CULT: NORMAL
BACTERIA: NEGATIVE
BILIRUBIN URINE: NEGATIVE
BLOOD URINE: NEGATIVE
C TRACH RRNA SPEC QL NAA+PROBE: NOT DETECTED
CANDIDA VAG CYTO: NOT DETECTED
COLOR: NORMAL
CYTOLOGY CVX/VAG DOC THIN PREP: NORMAL
G VAGINALIS+PREV SP MTYP VAG QL MICRO: NOT DETECTED
GLUCOSE QUALITATIVE U: NEGATIVE
HCG UR QL: NEGATIVE
HPV HIGH+LOW RISK DNA PNL CVX: NOT DETECTED
HYALINE CASTS: 0 /LPF
KETONES URINE: NEGATIVE
LEUKOCYTE ESTERASE URINE: NEGATIVE
MICROSCOPIC-UA: NORMAL
N GONORRHOEA RRNA SPEC QL NAA+PROBE: NOT DETECTED
NITRITE URINE: NEGATIVE
PH URINE: 6
PROTEIN URINE: NEGATIVE
QUALITY CONTROL: YES
RED BLOOD CELLS URINE: 2 /HPF
SOURCE AMPLIFICATION: NORMAL
SPECIFIC GRAVITY URINE: 1.02
SQUAMOUS EPITHELIAL CELLS: 3 /HPF
T VAGINALIS VAG QL WET PREP: NOT DETECTED
UROBILINOGEN URINE: NORMAL
WHITE BLOOD CELLS URINE: 1 /HPF

## 2022-01-19 NOTE — PLAN
[FreeTextEntry1] : \par \par I spent the time noted on the day of this patient encounter preparing for, providing and documenting the above E/M service and counseling and educate patient on differential, workup, disease course, and treatment/management. Education was provided to the patient during this encounter. All questions and concerns were answered and addressed in detail.\par \par Olga Huang MD\par
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
## 2022-01-31 ENCOUNTER — APPOINTMENT (OUTPATIENT)
Dept: ULTRASOUND IMAGING | Facility: CLINIC | Age: 42
End: 2022-01-31

## 2022-04-26 ENCOUNTER — RESULT REVIEW (OUTPATIENT)
Age: 42
End: 2022-04-26

## 2022-04-26 ENCOUNTER — APPOINTMENT (OUTPATIENT)
Dept: ULTRASOUND IMAGING | Facility: CLINIC | Age: 42
End: 2022-04-26
Payer: COMMERCIAL

## 2022-04-26 ENCOUNTER — OUTPATIENT (OUTPATIENT)
Dept: OUTPATIENT SERVICES | Facility: HOSPITAL | Age: 42
LOS: 1 days | End: 2022-04-26
Payer: COMMERCIAL

## 2022-04-26 ENCOUNTER — APPOINTMENT (OUTPATIENT)
Dept: MAMMOGRAPHY | Facility: CLINIC | Age: 42
End: 2022-04-26
Payer: COMMERCIAL

## 2022-04-26 DIAGNOSIS — Z01.419 ENCOUNTER FOR GYNECOLOGICAL EXAMINATION (GENERAL) (ROUTINE) WITHOUT ABNORMAL FINDINGS: ICD-10-CM

## 2022-04-26 DIAGNOSIS — N93.9 ABNORMAL UTERINE AND VAGINAL BLEEDING, UNSPECIFIED: ICD-10-CM

## 2022-04-26 DIAGNOSIS — Z00.8 ENCOUNTER FOR OTHER GENERAL EXAMINATION: ICD-10-CM

## 2022-04-26 DIAGNOSIS — Z90.49 ACQUIRED ABSENCE OF OTHER SPECIFIED PARTS OF DIGESTIVE TRACT: Chronic | ICD-10-CM

## 2022-04-26 PROCEDURE — 76641 ULTRASOUND BREAST COMPLETE: CPT | Mod: 26,50

## 2022-04-26 PROCEDURE — 77067 SCR MAMMO BI INCL CAD: CPT | Mod: 26

## 2022-04-26 PROCEDURE — 77063 BREAST TOMOSYNTHESIS BI: CPT | Mod: 26

## 2022-04-26 PROCEDURE — 77067 SCR MAMMO BI INCL CAD: CPT

## 2022-04-26 PROCEDURE — 76856 US EXAM PELVIC COMPLETE: CPT | Mod: 26

## 2022-04-26 PROCEDURE — 77063 BREAST TOMOSYNTHESIS BI: CPT

## 2022-04-26 PROCEDURE — 76830 TRANSVAGINAL US NON-OB: CPT | Mod: 26

## 2022-04-26 PROCEDURE — 76641 ULTRASOUND BREAST COMPLETE: CPT

## 2022-04-26 PROCEDURE — 76856 US EXAM PELVIC COMPLETE: CPT

## 2022-04-26 PROCEDURE — 76830 TRANSVAGINAL US NON-OB: CPT

## 2022-05-02 ENCOUNTER — RESULT REVIEW (OUTPATIENT)
Age: 42
End: 2022-05-02

## 2022-05-02 ENCOUNTER — APPOINTMENT (OUTPATIENT)
Dept: ULTRASOUND IMAGING | Facility: CLINIC | Age: 42
End: 2022-05-02
Payer: COMMERCIAL

## 2022-05-02 ENCOUNTER — APPOINTMENT (OUTPATIENT)
Dept: MAMMOGRAPHY | Facility: CLINIC | Age: 42
End: 2022-05-02
Payer: COMMERCIAL

## 2022-05-02 ENCOUNTER — OUTPATIENT (OUTPATIENT)
Dept: OUTPATIENT SERVICES | Facility: HOSPITAL | Age: 42
LOS: 1 days | End: 2022-05-02
Payer: COMMERCIAL

## 2022-05-02 DIAGNOSIS — Z90.49 ACQUIRED ABSENCE OF OTHER SPECIFIED PARTS OF DIGESTIVE TRACT: Chronic | ICD-10-CM

## 2022-05-02 DIAGNOSIS — Z00.8 ENCOUNTER FOR OTHER GENERAL EXAMINATION: ICD-10-CM

## 2022-05-02 PROCEDURE — G0279: CPT | Mod: 26

## 2022-05-02 PROCEDURE — G0279: CPT

## 2022-05-02 PROCEDURE — 77065 DX MAMMO INCL CAD UNI: CPT | Mod: 26,RT

## 2022-05-02 PROCEDURE — 77065 DX MAMMO INCL CAD UNI: CPT

## 2022-05-02 PROCEDURE — 76642 ULTRASOUND BREAST LIMITED: CPT

## 2022-05-02 PROCEDURE — 76642 ULTRASOUND BREAST LIMITED: CPT | Mod: 26,RT

## 2022-05-03 ENCOUNTER — APPOINTMENT (OUTPATIENT)
Dept: OBGYN | Facility: CLINIC | Age: 42
End: 2022-05-03
Payer: COMMERCIAL

## 2022-05-03 VITALS
BODY MASS INDEX: 23.82 KG/M2 | HEIGHT: 65 IN | OXYGEN SATURATION: 98 % | DIASTOLIC BLOOD PRESSURE: 60 MMHG | HEART RATE: 83 BPM | WEIGHT: 143 LBS | SYSTOLIC BLOOD PRESSURE: 124 MMHG | TEMPERATURE: 98 F

## 2022-05-03 DIAGNOSIS — Z87.42 PERSONAL HISTORY OF OTHER DISEASES OF THE FEMALE GENITAL TRACT: ICD-10-CM

## 2022-05-03 PROCEDURE — 99214 OFFICE O/P EST MOD 30 MIN: CPT

## 2022-05-05 LAB
ESTRADIOL SERPL-MCNC: 355 PG/ML
HCG SERPL-MCNC: <1 MIU/ML
PROLACTIN SERPL-MCNC: 33.1 NG/ML
PROLACTIN SERPL-MCNC: 33.3 NG/ML
TSH SERPL-ACNC: 1.38 UIU/ML

## 2022-05-09 NOTE — DISCHARGE NOTE NURSING/CASE MANAGEMENT/SOCIAL WORK - NSDCDPATPORTLINK_GEN_ALL_CORE
You can access the [facility] Patient Portal, offered by [facility], by registering with the following website: [email or web address] show

## 2022-05-10 ENCOUNTER — OUTPATIENT (OUTPATIENT)
Dept: OUTPATIENT SERVICES | Facility: HOSPITAL | Age: 42
LOS: 1 days | End: 2022-05-10
Payer: COMMERCIAL

## 2022-05-10 ENCOUNTER — APPOINTMENT (OUTPATIENT)
Dept: MRI IMAGING | Facility: CLINIC | Age: 42
End: 2022-05-10
Payer: COMMERCIAL

## 2022-05-10 DIAGNOSIS — Z90.49 ACQUIRED ABSENCE OF OTHER SPECIFIED PARTS OF DIGESTIVE TRACT: Chronic | ICD-10-CM

## 2022-05-10 DIAGNOSIS — R79.89 OTHER SPECIFIED ABNORMAL FINDINGS OF BLOOD CHEMISTRY: ICD-10-CM

## 2022-05-10 DIAGNOSIS — Z00.8 ENCOUNTER FOR OTHER GENERAL EXAMINATION: ICD-10-CM

## 2022-05-10 PROBLEM — Z87.42 H/O: MENORRHAGIA: Status: ACTIVE | Noted: 2022-05-10

## 2022-05-10 LAB — FSH: 14 MIU/ML

## 2022-05-10 PROCEDURE — 70553 MRI BRAIN STEM W/O & W/DYE: CPT | Mod: 26

## 2022-05-10 PROCEDURE — 70553 MRI BRAIN STEM W/O & W/DYE: CPT

## 2022-05-10 PROCEDURE — A9585: CPT

## 2022-05-10 NOTE — PLAN
[FreeTextEntry1] : \par \par We discussed the risks, benefits and alternatives of the procedure including, but not limited to: pain, bleeding, infection, risk of damage to the uterus, uterine perforation, chance of diagnostic laparoscopy or laparotomy, risk of damage of structures surrounding the uterus including but not limited to bowel, bladder ureters, nerves, and vessels. The chance of deep vein thromboses was also discussed. The patient expressed understanding of the risks, benefits and alternatives of the procedure and was able to explain them in her own words.\par \par I spent the time noted on the day of this patient encounter preparing for, providing and documenting the above E/M service and counseling and educate patient on differential, workup, disease course, and treatment/management. Education was provided to the patient during this encounter. All questions and concerns were answered and addressed in detail.\par \par Olga Huang MD\par

## 2022-05-10 NOTE — HISTORY OF PRESENT ILLNESS
[FreeTextEntry1] : 43 yo elevated PRL\par still heavy menses\par \par Wants HSC, D+C ablation \par June 20th--schedule

## 2022-05-11 ENCOUNTER — OUTPATIENT (OUTPATIENT)
Dept: OUTPATIENT SERVICES | Facility: HOSPITAL | Age: 42
LOS: 1 days | End: 2022-05-11
Payer: COMMERCIAL

## 2022-05-11 VITALS
WEIGHT: 147.05 LBS | RESPIRATION RATE: 18 BRPM | TEMPERATURE: 98 F | OXYGEN SATURATION: 100 % | HEART RATE: 71 BPM | HEIGHT: 65.5 IN | DIASTOLIC BLOOD PRESSURE: 71 MMHG | SYSTOLIC BLOOD PRESSURE: 102 MMHG

## 2022-05-11 DIAGNOSIS — Z01.818 ENCOUNTER FOR OTHER PREPROCEDURAL EXAMINATION: ICD-10-CM

## 2022-05-11 DIAGNOSIS — M53.85 OTHER SPECIFIED DORSOPATHIES, THORACOLUMBAR REGION: ICD-10-CM

## 2022-05-11 DIAGNOSIS — K21.9 GASTRO-ESOPHAGEAL REFLUX DISEASE WITHOUT ESOPHAGITIS: ICD-10-CM

## 2022-05-11 DIAGNOSIS — Z90.49 ACQUIRED ABSENCE OF OTHER SPECIFIED PARTS OF DIGESTIVE TRACT: Chronic | ICD-10-CM

## 2022-05-11 DIAGNOSIS — Z98.890 OTHER SPECIFIED POSTPROCEDURAL STATES: Chronic | ICD-10-CM

## 2022-05-11 DIAGNOSIS — D64.9 ANEMIA, UNSPECIFIED: ICD-10-CM

## 2022-05-11 DIAGNOSIS — M53.82 OTHER SPECIFIED DORSOPATHIES, CERVICAL REGION: ICD-10-CM

## 2022-05-11 DIAGNOSIS — N65.0 DEFORMITY OF RECONSTRUCTED BREAST: ICD-10-CM

## 2022-05-11 LAB
ANION GAP SERPL CALC-SCNC: 12 MMOL/L — SIGNIFICANT CHANGE UP (ref 5–17)
BUN SERPL-MCNC: 10 MG/DL — SIGNIFICANT CHANGE UP (ref 7–23)
CALCIUM SERPL-MCNC: 9.2 MG/DL — SIGNIFICANT CHANGE UP (ref 8.4–10.5)
CHLORIDE SERPL-SCNC: 106 MMOL/L — SIGNIFICANT CHANGE UP (ref 96–108)
CO2 SERPL-SCNC: 21 MMOL/L — LOW (ref 22–31)
CREAT SERPL-MCNC: 0.61 MG/DL — SIGNIFICANT CHANGE UP (ref 0.5–1.3)
EGFR: 113 ML/MIN/1.73M2 — SIGNIFICANT CHANGE UP
GLUCOSE SERPL-MCNC: 89 MG/DL — SIGNIFICANT CHANGE UP (ref 70–99)
HCT VFR BLD CALC: 36.9 % — SIGNIFICANT CHANGE UP (ref 34.5–45)
HGB BLD-MCNC: 11.3 G/DL — LOW (ref 11.5–15.5)
MCHC RBC-ENTMCNC: 25.7 PG — LOW (ref 27–34)
MCHC RBC-ENTMCNC: 30.6 GM/DL — LOW (ref 32–36)
MCV RBC AUTO: 84.1 FL — SIGNIFICANT CHANGE UP (ref 80–100)
NRBC # BLD: 0 /100 WBCS — SIGNIFICANT CHANGE UP (ref 0–0)
PLATELET # BLD AUTO: 404 K/UL — HIGH (ref 150–400)
POTASSIUM SERPL-MCNC: 4.1 MMOL/L — SIGNIFICANT CHANGE UP (ref 3.5–5.3)
POTASSIUM SERPL-SCNC: 4.1 MMOL/L — SIGNIFICANT CHANGE UP (ref 3.5–5.3)
RBC # BLD: 4.39 M/UL — SIGNIFICANT CHANGE UP (ref 3.8–5.2)
RBC # FLD: 17.4 % — HIGH (ref 10.3–14.5)
SODIUM SERPL-SCNC: 139 MMOL/L — SIGNIFICANT CHANGE UP (ref 135–145)
WBC # BLD: 11 K/UL — HIGH (ref 3.8–10.5)
WBC # FLD AUTO: 11 K/UL — HIGH (ref 3.8–10.5)

## 2022-05-11 PROCEDURE — 80048 BASIC METABOLIC PNL TOTAL CA: CPT

## 2022-05-11 PROCEDURE — G0463: CPT

## 2022-05-11 PROCEDURE — 85027 COMPLETE CBC AUTOMATED: CPT

## 2022-05-11 RX ORDER — SODIUM CHLORIDE 9 MG/ML
1000 INJECTION, SOLUTION INTRAVENOUS
Refills: 0 | Status: DISCONTINUED | OUTPATIENT
Start: 2022-05-27 | End: 2022-06-11

## 2022-05-11 NOTE — H&P PST ADULT - HISTORY OF PRESENT ILLNESS
42 year old female presents accompanied with her daughter, for preop testing for scheduled bilateral breast reduction. Her medical history significant for GERD, elevated prolactin level (been monitored x 1 year), iron deficiency anemia, uterine leiomyoma.   Patient denies any recent travel and no known exposure to covid-19, she is scheduled for covid-19 PCR test on 5/25/2022 at [facility].

## 2022-05-11 NOTE — H&P PST ADULT - NSICDXPASTMEDICALHX_GEN_ALL_CORE_FT
PAST MEDICAL HISTORY:  GERD (gastroesophageal reflux disease)      PAST MEDICAL HISTORY:  Anemia     Fibroid uterus     GERD (gastroesophageal reflux disease)     H/O Helicobacter infection treated April 2021    Herniated cervical disc C6-7    History of sciatica right side    Increased prolactin level been  monitored x1

## 2022-05-11 NOTE — H&P PST ADULT - PROBLEM SELECTOR PLAN 1
Scheduled for bilateral breast reduction   preop instruction including the use of chlorhexidine wash   ucg on admit, urine cup given

## 2022-05-11 NOTE — H&P PST ADULT - NSICDXPASTSURGICALHX_GEN_ALL_CORE_FT
PAST SURGICAL HISTORY:  S/P flako lap flako 5+ years ago     PAST SURGICAL HISTORY:  History of bunionectomy     History of colonoscopy     S/P flako lap flako 5+ years ago

## 2022-05-18 PROBLEM — Z86.69 PERSONAL HISTORY OF OTHER DISEASES OF THE NERVOUS SYSTEM AND SENSE ORGANS: Chronic | Status: ACTIVE | Noted: 2022-05-11

## 2022-05-18 PROBLEM — M50.20 OTHER CERVICAL DISC DISPLACEMENT, UNSPECIFIED CERVICAL REGION: Chronic | Status: ACTIVE | Noted: 2022-05-11

## 2022-05-18 PROBLEM — Z86.19 PERSONAL HISTORY OF OTHER INFECTIOUS AND PARASITIC DISEASES: Chronic | Status: ACTIVE | Noted: 2022-05-11

## 2022-05-18 PROBLEM — D64.9 ANEMIA, UNSPECIFIED: Chronic | Status: ACTIVE | Noted: 2022-05-11

## 2022-05-18 PROBLEM — D25.9 LEIOMYOMA OF UTERUS, UNSPECIFIED: Chronic | Status: ACTIVE | Noted: 2022-05-11

## 2022-05-19 ENCOUNTER — APPOINTMENT (OUTPATIENT)
Dept: ENDOCRINOLOGY | Facility: CLINIC | Age: 42
End: 2022-05-19
Payer: COMMERCIAL

## 2022-05-19 VITALS
HEART RATE: 60 BPM | OXYGEN SATURATION: 92 % | SYSTOLIC BLOOD PRESSURE: 114 MMHG | DIASTOLIC BLOOD PRESSURE: 80 MMHG | WEIGHT: 145 LBS | RESPIRATION RATE: 17 BRPM | HEIGHT: 65 IN | BODY MASS INDEX: 24.16 KG/M2 | TEMPERATURE: 98 F

## 2022-05-19 DIAGNOSIS — E22.1 HYPERPROLACTINEMIA: ICD-10-CM

## 2022-05-19 LAB
CORTIS SERPL-MCNC: 8.8 UG/DL
OSMOLALITY SERPL: 307 MOSMOL/KG

## 2022-05-19 PROCEDURE — 36415 COLL VENOUS BLD VENIPUNCTURE: CPT

## 2022-05-19 PROCEDURE — 99204 OFFICE O/P NEW MOD 45 MIN: CPT | Mod: 25

## 2022-05-19 NOTE — CONSULT LETTER
[Dear  ___] : Dear  [unfilled], [Sincerely,] : Sincerely, [FreeTextEntry1] : Thank you for referring  Ms. GIO YUSUF to me for evaluation and treatment. Please, see attached consultation note. As always, if there are specific questions you would like to discuss, please feel free to contact me.\par Thank you for the courtesy of this evaluation.\par  [FreeTextEntry3] : Julian Cramer MD, FACE, ECNU\par

## 2022-05-19 NOTE — ASSESSMENT
[FreeTextEntry1] : Pituitary microadenoma\par - fasting am pituitary panel\par - given new symptoms, might consider a small dose of DA\par - repeat pituitary MRI in 11/22, or sooner prn

## 2022-05-19 NOTE — HISTORY OF PRESENT ILLNESS
[FreeTextEntry1] : 42 year female  referred for pituitary evaluation \par \par Ms. YSUUF  was undergoing a preoperative evaluation for her breast reduction, and as a part of her blood work she was found to have a a slightly elevated prolactin level. She's been complaining of the irregular menstrual cycle for the past year or so, as well temporal/occipital areas headaches, occasional right breast minimal milky discharge. Her vision gets blurry when she has headaches. Her weight has been stable, always with a cold intolerance (was treated for anemia), denies new purple stretch marks or easy bruising ,  jaw protruding, change in facial features or change in shoe/hat/ring size. Also, she denies diaphoresis or sweating, chest pain or palpitations, SOB, diarrhea or constipation, anxiety or tremor, muscle aches. Her cousin was also diagnosed with a macroadenoma, requiring TSSR.\par \par Pituitary MRI (5/10/2022)–0.5 x 0.5 x 0.6 cm pituitary microadenoma abutting the cavernous segment of the right ICA without encasement or invasion.  There is a second smaller lesion in the left pituitary gland measuring 0.4 x 0.4 cm.  No mass-effect upon the optic chiasm.  Slight leftward deviation of the pituitary stalk.\par

## 2022-05-25 ENCOUNTER — OUTPATIENT (OUTPATIENT)
Dept: OUTPATIENT SERVICES | Facility: HOSPITAL | Age: 42
LOS: 1 days | End: 2022-05-25
Payer: COMMERCIAL

## 2022-05-25 DIAGNOSIS — Z11.52 ENCOUNTER FOR SCREENING FOR COVID-19: ICD-10-CM

## 2022-05-25 DIAGNOSIS — Z98.890 OTHER SPECIFIED POSTPROCEDURAL STATES: Chronic | ICD-10-CM

## 2022-05-25 DIAGNOSIS — Z90.49 ACQUIRED ABSENCE OF OTHER SPECIFIED PARTS OF DIGESTIVE TRACT: Chronic | ICD-10-CM

## 2022-05-25 LAB — SARS-COV-2 RNA SPEC QL NAA+PROBE: SIGNIFICANT CHANGE UP

## 2022-05-25 PROCEDURE — U0003: CPT

## 2022-05-25 PROCEDURE — C9803: CPT

## 2022-05-25 PROCEDURE — U0005: CPT

## 2022-05-26 ENCOUNTER — TRANSCRIPTION ENCOUNTER (OUTPATIENT)
Age: 42
End: 2022-05-26

## 2022-05-26 LAB
ALBUMIN SERPL ELPH-MCNC: 4.5 G/DL
ALP BLD-CCNC: 57 U/L
ALT SERPL-CCNC: 11 U/L
ANION GAP SERPL CALC-SCNC: 16 MMOL/L
AST SERPL-CCNC: 15 U/L
BILIRUB SERPL-MCNC: 0.2 MG/DL
BUN SERPL-MCNC: 10 MG/DL
CALCIUM SERPL-MCNC: 9.2 MG/DL
CHLORIDE SERPL-SCNC: 105 MMOL/L
CO2 SERPL-SCNC: 21 MMOL/L
CREAT SERPL-MCNC: 0.7 MG/DL
EGFR: 109 ML/MIN/1.73M2
ESTRADIOL SERPL-MCNC: 29 PG/ML
FSH SERPL-MCNC: 6.1 IU/L
GLUCOSE SERPL-MCNC: 79 MG/DL
HCG SERPL QL: NEGATIVE
IGF-1 INTERP: NORMAL
IGF-I BLD-MCNC: 212 NG/ML
LH SERPL-ACNC: 5.2 IU/L
MONOMERIC PROLACTIN (ICMA)*: 13.7 NG/ML
PAPP-A SERPL-ACNC: <1 MIU/ML
PERCENT MACROPROLACTIN: 21 %
POTASSIUM SERPL-SCNC: 4.5 MMOL/L
PROLACTIN SERPL-MCNC: 18.3 NG/ML
PROLACTIN SERPL-MCNC: 18.3 NG/ML
PROLACTIN SERPL-MCNC: 19.9 NG/ML
PROLACTIN, SERUM (ICMA)*: 17.3 NG/ML
PROT SERPL-MCNC: 7.1 G/DL
SODIUM SERPL-SCNC: 142 MMOL/L
T4 FREE SERPL-MCNC: 1.3 NG/DL
TSH SERPL-ACNC: 1.31 UIU/ML

## 2022-05-27 ENCOUNTER — RESULT REVIEW (OUTPATIENT)
Age: 42
End: 2022-05-27

## 2022-05-27 ENCOUNTER — OUTPATIENT (OUTPATIENT)
Dept: OUTPATIENT SERVICES | Facility: HOSPITAL | Age: 42
LOS: 1 days | End: 2022-05-27
Payer: COMMERCIAL

## 2022-05-27 ENCOUNTER — TRANSCRIPTION ENCOUNTER (OUTPATIENT)
Age: 42
End: 2022-05-27

## 2022-05-27 VITALS
OXYGEN SATURATION: 98 % | DIASTOLIC BLOOD PRESSURE: 65 MMHG | WEIGHT: 147.05 LBS | HEART RATE: 84 BPM | HEIGHT: 65.5 IN | SYSTOLIC BLOOD PRESSURE: 98 MMHG | RESPIRATION RATE: 16 BRPM | TEMPERATURE: 98 F

## 2022-05-27 VITALS
OXYGEN SATURATION: 100 % | SYSTOLIC BLOOD PRESSURE: 98 MMHG | DIASTOLIC BLOOD PRESSURE: 57 MMHG | HEART RATE: 73 BPM | RESPIRATION RATE: 18 BRPM

## 2022-05-27 DIAGNOSIS — Z98.890 OTHER SPECIFIED POSTPROCEDURAL STATES: Chronic | ICD-10-CM

## 2022-05-27 DIAGNOSIS — Z90.49 ACQUIRED ABSENCE OF OTHER SPECIFIED PARTS OF DIGESTIVE TRACT: Chronic | ICD-10-CM

## 2022-05-27 DIAGNOSIS — N65.0 DEFORMITY OF RECONSTRUCTED BREAST: ICD-10-CM

## 2022-05-27 DIAGNOSIS — M53.85 OTHER SPECIFIED DORSOPATHIES, THORACOLUMBAR REGION: ICD-10-CM

## 2022-05-27 DIAGNOSIS — M53.82 OTHER SPECIFIED DORSOPATHIES, CERVICAL REGION: ICD-10-CM

## 2022-05-27 PROCEDURE — 88304 TISSUE EXAM BY PATHOLOGIST: CPT | Mod: 26

## 2022-05-27 PROCEDURE — C9399: CPT

## 2022-05-27 PROCEDURE — 19318 BREAST REDUCTION: CPT | Mod: 50

## 2022-05-27 PROCEDURE — 88304 TISSUE EXAM BY PATHOLOGIST: CPT

## 2022-05-27 RX ORDER — OXYCODONE HYDROCHLORIDE 5 MG/1
5 TABLET ORAL ONCE
Refills: 0 | Status: DISCONTINUED | OUTPATIENT
Start: 2022-05-27 | End: 2022-05-27

## 2022-05-27 RX ORDER — CHLORHEXIDINE GLUCONATE 213 G/1000ML
1 SOLUTION TOPICAL ONCE
Refills: 0 | Status: COMPLETED | OUTPATIENT
Start: 2022-05-27 | End: 2022-05-27

## 2022-05-27 RX ORDER — LIDOCAINE HCL 20 MG/ML
0.2 VIAL (ML) INJECTION ONCE
Refills: 0 | Status: COMPLETED | OUTPATIENT
Start: 2022-05-27 | End: 2022-05-27

## 2022-05-27 RX ORDER — OXYCODONE HYDROCHLORIDE 5 MG/1
10 TABLET ORAL ONCE
Refills: 0 | Status: DISCONTINUED | OUTPATIENT
Start: 2022-05-27 | End: 2022-05-27

## 2022-05-27 RX ORDER — ONDANSETRON 8 MG/1
4 TABLET, FILM COATED ORAL ONCE
Refills: 0 | Status: DISCONTINUED | OUTPATIENT
Start: 2022-05-27 | End: 2022-06-11

## 2022-05-27 RX ORDER — CEFAZOLIN SODIUM 1 G
2000 VIAL (EA) INJECTION ONCE
Refills: 0 | Status: COMPLETED | OUTPATIENT
Start: 2022-05-27 | End: 2022-05-27

## 2022-05-27 RX ORDER — APREPITANT 80 MG/1
40 CAPSULE ORAL ONCE
Refills: 0 | Status: COMPLETED | OUTPATIENT
Start: 2022-05-27 | End: 2022-05-27

## 2022-05-27 RX ADMIN — OXYCODONE HYDROCHLORIDE 10 MILLIGRAM(S): 5 TABLET ORAL at 18:54

## 2022-05-27 RX ADMIN — CHLORHEXIDINE GLUCONATE 1 APPLICATION(S): 213 SOLUTION TOPICAL at 13:16

## 2022-05-27 RX ADMIN — APREPITANT 40 MILLIGRAM(S): 80 CAPSULE ORAL at 13:19

## 2022-05-27 RX ADMIN — SODIUM CHLORIDE 100 MILLILITER(S): 9 INJECTION, SOLUTION INTRAVENOUS at 13:16

## 2022-05-27 RX ADMIN — OXYCODONE HYDROCHLORIDE 10 MILLIGRAM(S): 5 TABLET ORAL at 18:26

## 2022-05-27 NOTE — ASU PREOP CHECKLIST - HIBICLENS SHOWER 1 DATE
Over the last 2 weeks, how often have you been bothered by the following problems?          PHQ2 Score: 2  PHQ2 Score Interpretation: No further screening needed  1. Little interest or pleasure in activity?: 1  2. Feeling down, depressed, or hopeless?: 1     PHQ9 Score: 15  PHQ9 Score Interpretation: Moderately Severe Depression  3. Trouble falling, staying asleep or sleeping all the time?: 1  4. Feeling tired or having little energy?: 3  5. Poor appetite or overeating?: 3  6. Feeling bad about yourself - or that you are a failure or that you have let yourself or your family down?: 2  7. Trouble concentrating on things such as reading a newspaper or watching television?: 2  8. Moving or speaking so slowly that other people could have noticed? Or the opposite - being so fidgety or restless that you were moving around a lot more than usual?: 2  9. Thoughts that you would be better off dead, or of hurting yourself in some way?: 0       Generalized Anxiety Disorder (GAD7)    Over the last 2 weeks, how often have you been bothered by the following problems?   Score: 14    Score:  0-4 minimal symptoms 10-14 moderate symptoms   5-9 mild symptoms 15-21 severe symptoms      1.  Feeling nervous, anxious or on edge Several days   2.  Not being able to stop or control worrying Several days   3.  Worrying too much about different things Nearly every day   4.  Trouble relaxing Several days   5.  Being so restless that it's hard to sit still Nearly every day   6.  Becoming easily annoyed or irritable Nearly every day   7.  Feeling afraid as if something awful might happen More than half the days            If you checked off any problems, how difficult have these made it for you to do your work, take care of things at home, or get along with other people? Somewhat difficult         27-May-2022

## 2022-05-27 NOTE — ASU PATIENT PROFILE, ADULT - NSICDXPASTMEDICALHX_GEN_ALL_CORE_FT
PAST MEDICAL HISTORY:  Anemia     Fibroid uterus     GERD (gastroesophageal reflux disease)     H/O Helicobacter infection treated April 2021    Herniated cervical disc C6-7    History of sciatica right side    Increased prolactin level been  monitored x1

## 2022-05-27 NOTE — ASU DISCHARGE PLAN (ADULT/PEDIATRIC) - CARE PROVIDER_API CALL
Umer Zavala)  Plastic Surgery  [address], [address]  [place] [address]  Phone: [phone]  Fax: [phone]  Scheduled Appointment: 06/06/2022

## 2022-05-27 NOTE — ASU DISCHARGE PLAN (ADULT/PEDIATRIC) - NURSING INSTRUCTIONS
Next dose of Tylenol will be on or after ______10pm_____ ,today/tonight and every 6 hours afterwards for pain management, do not take any Tylenol containing products until this time. Your first dose of Tylenol was given at _____3:50pm______. Do not exceed more than 4000mg of Tylenol in one 24 hour setting.

## 2022-05-27 NOTE — ASU PATIENT PROFILE, ADULT - NSICDXPASTSURGICALHX_GEN_ALL_CORE_FT
PAST SURGICAL HISTORY:  History of bunionectomy     History of colonoscopy     S/P flako lap flako 5+ years ago

## 2022-05-27 NOTE — ASU DISCHARGE PLAN (ADULT/PEDIATRIC) - NS MD DC FALL RISK RISK
For information on Fall & Injury Prevention, visit: [email or web address] OR  [email or web address] OR  https://www.cdc.gov/steadi/patient.html

## 2022-05-27 NOTE — PRE-ANESTHESIA EVALUATION ADULT - NSANTHPMHFT_GEN_ALL_CORE
41 yo F h/o GERD (well controlled, no current symptoms) here for bilateral breast reduction  ET > 4 METS, no other PMH

## 2022-05-27 NOTE — ASU DISCHARGE PLAN (ADULT/PEDIATRIC) - ASU DC SPECIAL INSTRUCTIONSFT
May shower in 48 hours and re-apply bra.  Take pain meds as prescribed in office Diet: You may resume your usual diet. Avoid alcohol and excessive salt intake for two weeks following surgery. This will help to minimize swelling.      Activity: Start walking as soon as possible to increase circulation and prevent blood clots. You may take care of your personal needs as desired, however, no lifting or strenuous activity is allowed for 4 weeks following surgery. Driving is not permitted for at least two weeks.    Wound care: Keep your dressing clean, dry, and intact until seen by MD/PA. Wear the surgical bra which will be provided to you at all times. Do not smoke! It delays wound healing and increases the risk of complications.    Personal Hygiene: Do not get the operative area wet. You are allowed to sponge bathe. Do not remove the surgical bra. No tub soaking.      Things to expect: The operative area may be bruised, swollen, and painful. You may also have temporary numbness which will improve over time.    In case of emergency: call the office any time day or night. Post-operative care will be provided in the office one week following surgery. If you do not already have an appointment, please call during regular office hours to schedule     We wish you a pleasant recovery.

## 2022-06-07 ENCOUNTER — OUTPATIENT (OUTPATIENT)
Dept: OUTPATIENT SERVICES | Facility: HOSPITAL | Age: 42
LOS: 1 days | End: 2022-06-07
Payer: COMMERCIAL

## 2022-06-07 VITALS
DIASTOLIC BLOOD PRESSURE: 77 MMHG | HEIGHT: 67 IN | TEMPERATURE: 98 F | OXYGEN SATURATION: 100 % | HEART RATE: 78 BPM | RESPIRATION RATE: 16 BRPM | SYSTOLIC BLOOD PRESSURE: 121 MMHG | WEIGHT: 147.93 LBS

## 2022-06-07 DIAGNOSIS — D64.9 ANEMIA, UNSPECIFIED: ICD-10-CM

## 2022-06-07 DIAGNOSIS — N92.0 EXCESSIVE AND FREQUENT MENSTRUATION WITH REGULAR CYCLE: ICD-10-CM

## 2022-06-07 DIAGNOSIS — Z01.818 ENCOUNTER FOR OTHER PREPROCEDURAL EXAMINATION: ICD-10-CM

## 2022-06-07 DIAGNOSIS — K21.9 GASTRO-ESOPHAGEAL REFLUX DISEASE WITHOUT ESOPHAGITIS: ICD-10-CM

## 2022-06-07 DIAGNOSIS — Z98.890 OTHER SPECIFIED POSTPROCEDURAL STATES: Chronic | ICD-10-CM

## 2022-06-07 DIAGNOSIS — Z90.49 ACQUIRED ABSENCE OF OTHER SPECIFIED PARTS OF DIGESTIVE TRACT: Chronic | ICD-10-CM

## 2022-06-07 LAB
HCT VFR BLD CALC: 35.2 % — SIGNIFICANT CHANGE UP (ref 34.5–45)
HGB BLD-MCNC: 11.1 G/DL — LOW (ref 11.5–15.5)
MCHC RBC-ENTMCNC: 27.1 PG — SIGNIFICANT CHANGE UP (ref 27–34)
MCHC RBC-ENTMCNC: 31.5 GM/DL — LOW (ref 32–36)
MCV RBC AUTO: 85.9 FL — SIGNIFICANT CHANGE UP (ref 80–100)
NRBC # BLD: 0 /100 WBCS — SIGNIFICANT CHANGE UP (ref 0–0)
PLATELET # BLD AUTO: 547 K/UL — HIGH (ref 150–400)
RBC # BLD: 4.1 M/UL — SIGNIFICANT CHANGE UP (ref 3.8–5.2)
RBC # FLD: 16.9 % — HIGH (ref 10.3–14.5)
WBC # BLD: 9.72 K/UL — SIGNIFICANT CHANGE UP (ref 3.8–10.5)
WBC # FLD AUTO: 9.72 K/UL — SIGNIFICANT CHANGE UP (ref 3.8–10.5)

## 2022-06-07 PROCEDURE — G0463: CPT

## 2022-06-07 PROCEDURE — 85027 COMPLETE CBC AUTOMATED: CPT

## 2022-06-07 PROCEDURE — 36415 COLL VENOUS BLD VENIPUNCTURE: CPT

## 2022-06-07 RX ORDER — NORETHINDRONE ACETATE 5 MG/1
5 TABLET ORAL DAILY
Qty: 10 | Refills: 1 | Status: ACTIVE | COMMUNITY
Start: 2022-06-07 | End: 1900-01-01

## 2022-06-07 RX ORDER — IBUPROFEN AND FAMOTIDINE 26.6; 8 MG/1; MG/1
1 TABLET, FILM COATED ORAL
Qty: 0 | Refills: 0 | DISCHARGE

## 2022-06-07 NOTE — H&P PST ADULT - HISTORY OF PRESENT ILLNESS
42 year old female presents accompanied with her daughter.  Her medical history significant for GERD, elevated prolactin level (been monitored x 1 year), iron deficiency anemia, uterine leiomyoma.    42 year old female presents accompanied with her daughter.  Her medical history significant for GERD, elevated prolactin level (been monitored x 1 year), iron deficiency anemia, uterine leiomyoma.   Otherwise patient feels well today and denies any acute symptoms.

## 2022-06-07 NOTE — H&P PST ADULT - NSICDXPASTSURGICALHX_GEN_ALL_CORE_FT
PAST SURGICAL HISTORY:  History of bunionectomy     History of colonoscopy     S/P flako lap flako 5+ years ago     PAST SURGICAL HISTORY:  History of bunionectomy     History of colonoscopy     S/P flako lap flako 5+ years ago    Status post breast reduction 5/2022

## 2022-06-07 NOTE — H&P PST ADULT - PROBLEM SELECTOR PLAN 1
NPO after 12 AM day of surgery.  She will stop and NSAIDS/Aspirin/vitamins/herbal supplement 7 days prior to surgery.  Pre-operative instructions provided.

## 2022-06-07 NOTE — H&P PST ADULT - FALL HARM RISK - UNIVERSAL INTERVENTIONS
Bed in lowest position, wheels locked, appropriate side rails in place/Call bell, personal items and telephone in reach/Instruct patient to call for assistance before getting out of bed or chair/Non-slip footwear when patient is out of bed/[place] to call system/Physically safe environment - no spills, clutter or unnecessary equipment/Purposeful Proactive Rounding/Room/bathroom lighting operational, light cord in reach

## 2022-06-07 NOTE — H&P PST ADULT - NSICDXPASTMEDICALHX_GEN_ALL_CORE_FT
PAST MEDICAL HISTORY:  Anemia     Fibroid uterus     GERD (gastroesophageal reflux disease)     H/O Helicobacter infection treated April 2021    Herniated cervical disc C6-7    History of sciatica right side    Increased prolactin level been  monitored x1    Menorrhagia

## 2022-06-08 PROBLEM — N92.0 EXCESSIVE AND FREQUENT MENSTRUATION WITH REGULAR CYCLE: Chronic | Status: ACTIVE | Noted: 2022-06-07

## 2022-06-09 ENCOUNTER — APPOINTMENT (OUTPATIENT)
Dept: INTERNAL MEDICINE | Facility: CLINIC | Age: 42
End: 2022-06-09
Payer: COMMERCIAL

## 2022-06-09 VITALS
WEIGHT: 148 LBS | BODY MASS INDEX: 24.66 KG/M2 | HEART RATE: 86 BPM | SYSTOLIC BLOOD PRESSURE: 100 MMHG | HEIGHT: 65 IN | DIASTOLIC BLOOD PRESSURE: 67 MMHG | OXYGEN SATURATION: 100 % | TEMPERATURE: 98 F

## 2022-06-09 DIAGNOSIS — B96.81 PEPTIC ULCER, SITE UNSPECIFIED, UNSPECIFIED AS ACUTE OR CHRONIC, W/OUT HEMORRHAGE OR PERFORATION: ICD-10-CM

## 2022-06-09 DIAGNOSIS — D75.839 THROMBOCYTOSIS, UNSPECIFIED: ICD-10-CM

## 2022-06-09 DIAGNOSIS — K27.9 PEPTIC ULCER, SITE UNSPECIFIED, UNSPECIFIED AS ACUTE OR CHRONIC, W/OUT HEMORRHAGE OR PERFORATION: ICD-10-CM

## 2022-06-09 DIAGNOSIS — D50.0 IRON DEFICIENCY ANEMIA SECONDARY TO BLOOD LOSS (CHRONIC): ICD-10-CM

## 2022-06-09 PROCEDURE — 36415 COLL VENOUS BLD VENIPUNCTURE: CPT

## 2022-06-09 PROCEDURE — 99213 OFFICE O/P EST LOW 20 MIN: CPT | Mod: 25

## 2022-06-09 NOTE — PHYSICAL EXAM

## 2022-06-12 ENCOUNTER — NON-APPOINTMENT (OUTPATIENT)
Age: 42
End: 2022-06-12

## 2022-06-14 ENCOUNTER — NON-APPOINTMENT (OUTPATIENT)
Age: 42
End: 2022-06-14

## 2022-06-16 ENCOUNTER — APPOINTMENT (OUTPATIENT)
Dept: OBGYN | Facility: HOSPITAL | Age: 42
End: 2022-06-16

## 2022-06-22 NOTE — HISTORY OF PRESENT ILLNESS
[FreeTextEntry1] : GI issues for years. . told had celiac- then told crohns disease. She has been hospitalized for stomach pain. recently and had EGD\par  EGD 8/2020- 8mmfundic ulcer\par  and gastritis diffusely.\par  normal colonoscopy\par  Biopsies suggested H. Pylori\par current symptoms- gets bloating and hard abdomen every day and acid inh\par er throat .\par  also gets fatigue \par  She uses pepcid otc helps a little and sometimes 3 times daily\par . she has no appetite-, and gets full quickly. And if she overeats she has pain. \par  recently she was told that she was anemic. she tried taking liquid  burns her stomach. \par Tried prenatals and caused frreqent BM's\par She uses NSAIDS due to pinched nerve in neck-uses 800mg 2-3 x a week. \par 6/9/2022\par Follow up patient was place on pantoprazole no h.pylori on breath test. not anemic or iron deficient.\par  recent breast reduction surgery. . she is now scheduled for uterine ablation. Her platelets were high. She tolerated ferrous gluconate. \par  her platelet count was 547\par  she had stopped taking Iron 5/26- restarted it 2 days ago\par

## 2022-06-22 NOTE — ASSESSMENT
[FreeTextEntry1] : High platelet due to her iron deficiency and does not preclude any surgical procedure from being done. \par No medical contraindications to suregry

## 2022-06-28 ENCOUNTER — APPOINTMENT (OUTPATIENT)
Dept: OBGYN | Facility: CLINIC | Age: 42
End: 2022-06-28

## 2022-07-25 ENCOUNTER — APPOINTMENT (OUTPATIENT)
Dept: OBGYN | Facility: CLINIC | Age: 42
End: 2022-07-25

## 2022-07-25 PROCEDURE — 99213 OFFICE O/P EST LOW 20 MIN: CPT | Mod: 57,95

## 2022-08-16 ENCOUNTER — OUTPATIENT (OUTPATIENT)
Dept: OUTPATIENT SERVICES | Facility: HOSPITAL | Age: 42
LOS: 1 days | End: 2022-08-16
Payer: COMMERCIAL

## 2022-08-16 VITALS
RESPIRATION RATE: 18 BRPM | OXYGEN SATURATION: 100 % | HEIGHT: 65 IN | SYSTOLIC BLOOD PRESSURE: 116 MMHG | TEMPERATURE: 97 F | HEART RATE: 70 BPM | WEIGHT: 151.68 LBS | DIASTOLIC BLOOD PRESSURE: 76 MMHG

## 2022-08-16 DIAGNOSIS — Z01.818 ENCOUNTER FOR OTHER PREPROCEDURAL EXAMINATION: ICD-10-CM

## 2022-08-16 DIAGNOSIS — Z98.890 OTHER SPECIFIED POSTPROCEDURAL STATES: Chronic | ICD-10-CM

## 2022-08-16 DIAGNOSIS — Z90.49 ACQUIRED ABSENCE OF OTHER SPECIFIED PARTS OF DIGESTIVE TRACT: Chronic | ICD-10-CM

## 2022-08-16 DIAGNOSIS — N92.0 EXCESSIVE AND FREQUENT MENSTRUATION WITH REGULAR CYCLE: ICD-10-CM

## 2022-08-16 LAB
HCT VFR BLD CALC: 34.1 % — LOW (ref 34.5–45)
HGB BLD-MCNC: 10.6 G/DL — LOW (ref 11.5–15.5)
MCHC RBC-ENTMCNC: 25.4 PG — LOW (ref 27–34)
MCHC RBC-ENTMCNC: 31.1 GM/DL — LOW (ref 32–36)
MCV RBC AUTO: 81.8 FL — SIGNIFICANT CHANGE UP (ref 80–100)
NRBC # BLD: 0 /100 WBCS — SIGNIFICANT CHANGE UP (ref 0–0)
PLATELET # BLD AUTO: 493 K/UL — HIGH (ref 150–400)
RBC # BLD: 4.17 M/UL — SIGNIFICANT CHANGE UP (ref 3.8–5.2)
RBC # FLD: 16.3 % — HIGH (ref 10.3–14.5)
WBC # BLD: 7.64 K/UL — SIGNIFICANT CHANGE UP (ref 3.8–10.5)
WBC # FLD AUTO: 7.64 K/UL — SIGNIFICANT CHANGE UP (ref 3.8–10.5)

## 2022-08-16 PROCEDURE — G0463: CPT

## 2022-08-16 PROCEDURE — 85027 COMPLETE CBC AUTOMATED: CPT

## 2022-08-16 PROCEDURE — 36415 COLL VENOUS BLD VENIPUNCTURE: CPT

## 2022-08-16 RX ORDER — FERROUS SULFATE 325(65) MG
1 TABLET ORAL
Qty: 0 | Refills: 0 | DISCHARGE

## 2022-08-16 RX ORDER — FAMOTIDINE 10 MG/ML
1 INJECTION INTRAVENOUS
Qty: 0 | Refills: 0 | DISCHARGE

## 2022-08-16 NOTE — H&P PST ADULT - NSANTHOSAYNRD_GEN_A_CORE
neck 13.5 inches/No. HUYEN screening performed.  STOP BANG Legend: 0-2 = LOW Risk; 3-4 = INTERMEDIATE Risk; 5-8 = HIGH Risk

## 2022-08-16 NOTE — H&P PST ADULT - NSICDXPASTMEDICALHX_GEN_ALL_CORE_FT
PAST MEDICAL HISTORY:  Anemia     Fibroid uterus     GERD (gastroesophageal reflux disease)     H/O Helicobacter infection treated April 2021    Herniated cervical disc C6-7    History of 2019 novel coronavirus disease (COVID-19) 6/2022    History of sciatica right side    Increased prolactin level last seen by endo 5/2022 with MRI and normal levels - FU every 3 months    Menorrhagia

## 2022-08-16 NOTE — H&P PST ADULT - FALL HARM RISK - UNIVERSAL INTERVENTIONS
Bed in lowest position, wheels locked, appropriate side rails in place/Call bell, personal items and telephone in reach/Instruct patient to call for assistance before getting out of bed or chair/Non-slip footwear when patient is out of bed/[place] to call system/Physically safe environment - no spills, clutter or unnecessary equipment/Purposeful Proactive Rounding/Room/bathroom lighting operational, light cord in reach

## 2022-08-16 NOTE — H&P PST ADULT - NSICDXPASTSURGICALHX_GEN_ALL_CORE_FT
PAST SURGICAL HISTORY:  History of bunionectomy     History of colonoscopy     S/P flako lap flako 5+ years ago    Status post breast reduction 5/2022

## 2022-08-16 NOTE — H&P PST ADULT - PROBLEM SELECTOR PLAN 1
Scheduled for hysteroscopy, hydrothermal ablation, dilation and curettage, mirena iud insertion with Dr Huang on 08/24/2022.  Pre op instructions given and patient verbalized understanding.  CBC, pending.  Pre op instructions given and patient verbalized understanding.  May take Pepcid AM of procedure with small sip of water.  To stop all ASA, NSAIDs, vitamins and supplements 1 week prior to procedure.  UCG AM of surgery Scheduled for hysteroscopy, hydrothermal ablation, dilation and curettage, Mirena iud insertion with Dr Huang on 08/24/2022.  Pre op instructions given and patient verbalized understanding.  CBC, pending.  Pre op instructions given and patient verbalized understanding.  May take Pepcid AM of procedure with small sip of water.  To stop all ASA, NSAIDs, vitamins and supplements 1 week prior to procedure.  UCG AM of surgery

## 2022-08-16 NOTE — H&P PST ADULT - HISTORY OF PRESENT ILLNESS
Her medical history significant for GERD, elevated prolactin level (been monitored x 1 year), iron deficiency anemia, uterine leiomyoma.   Otherwise patient feels well today and denies any acute symptoms.    41 y/o female with PMH of GERD, iron deficient anemia and elevated prolactin levels ( evaluated by endo 5/2022) presents for [facility].  Was scheduled for this procedure in June 2022 but needed to reschedule due pos COVID-19 ( home test only).  C/O still with heavy menstrual cycle but otherwise feeling well at PST today.  Scheduled for hysteroscopy, hydrothermal ablation, dilatation and curettage, Mirena IUD insertion with Dr Huang on 08/24/2022.  COVID-19 testing information provided.

## 2022-08-21 ENCOUNTER — NON-APPOINTMENT (OUTPATIENT)
Age: 42
End: 2022-08-21

## 2022-08-21 NOTE — HISTORY OF PRESENT ILLNESS
[FreeTextEntry1] : This visit was provided via telehealth using real time 2 way av technology. The patient ______ was located at ____ at the time of the visit. The provider Dr. Olga Huang was located at the medical office located in [address] [place] [address] at the time of the visit. The patient and provider participated in the telehealth visit. Verbal consent for the telehealth visit was given by the patient _____ on ______.\par \par \par \par \par Surgical consult follow up--for HSC, Ablation, Mirena, D+C.\par \par All questions and concerns answered/addressed.\par \par We discussed the risks, benefits and alternatives of the procedure including, but not limited to: pain, bleeding, infection, risk of damage to the uterus, uterine perforation, chance of diagnostic laparoscopy or laparotomy, risk of damage of structures surrounding the uterus including but not limited to bowel, bladder ureters, nerves, and vessels. The chance of deep vein thromboses was also discussed. The patient expressed understanding of the risks, benefits and alternatives of the procedure and was able to explain them in her own words.\par

## 2022-08-21 NOTE — PLAN
[FreeTextEntry1] : 8/24 surgery\par \par I spent the time noted on the day of this patient encounter preparing for, providing and documenting the above E/M service and counseling and educate patient on differential, workup, disease course, and treatment/management. Education was provided to the patient during this encounter. All questions and concerns were answered and addressed in detail.\par \par Olga Huang MD\par

## 2022-08-23 ENCOUNTER — TRANSCRIPTION ENCOUNTER (OUTPATIENT)
Age: 42
End: 2022-08-23

## 2022-08-24 ENCOUNTER — TRANSCRIPTION ENCOUNTER (OUTPATIENT)
Age: 42
End: 2022-08-24

## 2022-08-24 ENCOUNTER — APPOINTMENT (OUTPATIENT)
Dept: OBGYN | Facility: HOSPITAL | Age: 42
End: 2022-08-24

## 2022-08-24 ENCOUNTER — RESULT REVIEW (OUTPATIENT)
Age: 42
End: 2022-08-24

## 2022-08-24 ENCOUNTER — OUTPATIENT (OUTPATIENT)
Dept: OUTPATIENT SERVICES | Facility: HOSPITAL | Age: 42
LOS: 1 days | End: 2022-08-24
Payer: COMMERCIAL

## 2022-08-24 VITALS
OXYGEN SATURATION: 99 % | RESPIRATION RATE: 16 BRPM | DIASTOLIC BLOOD PRESSURE: 56 MMHG | TEMPERATURE: 97 F | SYSTOLIC BLOOD PRESSURE: 104 MMHG | HEART RATE: 68 BPM

## 2022-08-24 VITALS
TEMPERATURE: 98 F | HEIGHT: 65 IN | RESPIRATION RATE: 14 BRPM | DIASTOLIC BLOOD PRESSURE: 69 MMHG | SYSTOLIC BLOOD PRESSURE: 100 MMHG | OXYGEN SATURATION: 98 % | WEIGHT: 151.68 LBS | HEART RATE: 73 BPM

## 2022-08-24 DIAGNOSIS — N92.0 EXCESSIVE AND FREQUENT MENSTRUATION WITH REGULAR CYCLE: ICD-10-CM

## 2022-08-24 DIAGNOSIS — Z90.49 ACQUIRED ABSENCE OF OTHER SPECIFIED PARTS OF DIGESTIVE TRACT: Chronic | ICD-10-CM

## 2022-08-24 DIAGNOSIS — Z98.890 OTHER SPECIFIED POSTPROCEDURAL STATES: Chronic | ICD-10-CM

## 2022-08-24 LAB
BASOPHILS # BLD AUTO: 0.05 K/UL
BASOPHILS NFR BLD AUTO: 0.4 %
EOSINOPHIL # BLD AUTO: 0.11 K/UL
EOSINOPHIL NFR BLD AUTO: 0.9 %
FERRITIN SERPL-MCNC: 18 NG/ML
HCT VFR BLD CALC: 35.1 %
HGB BLD-MCNC: 10.8 G/DL
IMM GRANULOCYTES NFR BLD AUTO: 0.3 %
IRON SATN MFR SERPL: 40 %
IRON SERPL-MCNC: 185 UG/DL
LYMPHOCYTES # BLD AUTO: 1.47 K/UL
LYMPHOCYTES NFR BLD AUTO: 11.5 %
MAN DIFF?: NORMAL
MCHC RBC-ENTMCNC: 26.8 PG
MCHC RBC-ENTMCNC: 30.8 GM/DL
MCV RBC AUTO: 87.1 FL
MONOCYTES # BLD AUTO: 0.71 K/UL
MONOCYTES NFR BLD AUTO: 5.5 %
NEUTROPHILS # BLD AUTO: 10.42 K/UL
NEUTROPHILS NFR BLD AUTO: 81.4 %
PLATELET # BLD AUTO: 516 K/UL
RBC # BLD: 4.03 M/UL
RBC # FLD: 17.2 %
TIBC SERPL-MCNC: 468 UG/DL
UIBC SERPL-MCNC: 283 UG/DL
WBC # FLD AUTO: 12.8 K/UL

## 2022-08-24 PROCEDURE — 58353 ENDOMETR ABLATE THERMAL: CPT

## 2022-08-24 PROCEDURE — ZZZZZ: CPT

## 2022-08-24 PROCEDURE — 58300 INSERT INTRAUTERINE DEVICE: CPT

## 2022-08-24 PROCEDURE — 88305 TISSUE EXAM BY PATHOLOGIST: CPT

## 2022-08-24 PROCEDURE — 58563 HYSTEROSCOPY ABLATION: CPT

## 2022-08-24 PROCEDURE — 88305 TISSUE EXAM BY PATHOLOGIST: CPT | Mod: 26

## 2022-08-24 PROCEDURE — 58555 HYSTEROSCOPY DX SEP PROC: CPT | Mod: 59

## 2022-08-24 DEVICE — IUD MIRENA: Type: IMPLANTABLE DEVICE | Status: FUNCTIONAL

## 2022-08-24 RX ORDER — SODIUM CHLORIDE 9 MG/ML
1000 INJECTION, SOLUTION INTRAVENOUS
Refills: 0 | Status: DISCONTINUED | OUTPATIENT
Start: 2022-08-24 | End: 2022-08-24

## 2022-08-24 RX ORDER — ONDANSETRON 8 MG/1
4 TABLET, FILM COATED ORAL ONCE
Refills: 0 | Status: DISCONTINUED | OUTPATIENT
Start: 2022-08-24 | End: 2022-08-24

## 2022-08-24 RX ORDER — HYDROMORPHONE HYDROCHLORIDE 2 MG/ML
0.5 INJECTION INTRAMUSCULAR; INTRAVENOUS; SUBCUTANEOUS
Refills: 0 | Status: DISCONTINUED | OUTPATIENT
Start: 2022-08-24 | End: 2022-08-24

## 2022-08-24 RX ADMIN — HYDROMORPHONE HYDROCHLORIDE 0.5 MILLIGRAM(S): 2 INJECTION INTRAMUSCULAR; INTRAVENOUS; SUBCUTANEOUS at 14:05

## 2022-08-24 RX ADMIN — HYDROMORPHONE HYDROCHLORIDE 0.5 MILLIGRAM(S): 2 INJECTION INTRAMUSCULAR; INTRAVENOUS; SUBCUTANEOUS at 13:54

## 2022-08-24 RX ADMIN — SODIUM CHLORIDE 50 MILLILITER(S): 9 INJECTION, SOLUTION INTRAVENOUS at 10:40

## 2022-08-24 NOTE — ASU DISCHARGE PLAN (ADULT/PEDIATRIC) - MEDICATION INSTRUCTIONS
Subjective   Patient is a 54-year-old white female who presents to the ER today via EMS after friends found her to be unresponsive in the floor of her apartment.  Friends went to check on her when they had not heard from her in several days.  Patient has history of alcoholism and was found with bottles and cans of alcohol surrounding her, and having been incontinent of stool.  When EMS arrived she was found to have a blood sugar in the 30s, and hypotensive with a systolic in the 80s.  Patient was given D50 IV with improvement in mentation.  Upon arrival in the ER patient is awake and oriented.      History provided by:  EMS personnel and patient  Altered Mental Status  Presenting symptoms: unresponsiveness    Severity:  Severe  Most recent episode:  Today  Episode history:  Unable to specify  Timing:  Constant  Progression:  Resolved  Chronicity:  New  Context: alcohol use    Associated symptoms: bladder incontinence, nausea and weakness    Associated symptoms: no fever    Associated symptoms comment:  Stool incontinence      Review of Systems   Constitutional: Positive for fatigue. Negative for chills and fever.   Respiratory: Negative for shortness of breath.    Cardiovascular: Negative for chest pain.   Gastrointestinal: Positive for diarrhea and nausea.   Genitourinary: Positive for bladder incontinence.   Neurological: Positive for weakness.   All other systems reviewed and are negative.      Past Medical History:   Diagnosis Date   • Alcohol abuse    • Anxiety    • Arthritis    • Blistering of skin 3/1/2020   • Degenerative disc disease, lumbar    • Depression    • Hypertension        No Known Allergies    Past Surgical History:   Procedure Laterality Date   • APPENDECTOMY     • BARIATRIC SURGERY      2005, gastric sleeve   • CARPAL TUNNEL RELEASE Bilateral     07/2020   • CHOLECYSTECTOMY     • HERNIA REPAIR      2014   • SPINE SURGERY      cyst removal       Family History   Problem Relation Age of Onset   •  Arthritis Mother    • Cancer Mother    • Obesity Mother    • Mental illness Mother    • Hypertension Mother    • Arthritis Sister    • Obesity Sister    • Mental illness Sister    • Hypertension Sister    • Obesity Brother    • Mental illness Brother    • Hypertension Brother    • Cancer Maternal Grandmother    • Breast cancer Neg Hx    • Ovarian cancer Neg Hx        Social History     Socioeconomic History   • Marital status:      Spouse name: Not on file   • Number of children: Not on file   • Years of education: Not on file   • Highest education level: Not on file   Tobacco Use   • Smoking status: Never Smoker   • Smokeless tobacco: Never Used   Substance and Sexual Activity   • Alcohol use: Yes     Alcohol/week: 6.0 standard drinks     Types: 6 Cans of beer per week     Comment: quit 02/18/2020, prior heavy alcohol use for 15y   • Drug use: Not Currently   • Sexual activity: Not Currently     Partners: Male     Birth control/protection: Post-menopausal           Objective   Physical Exam  Vitals and nursing note reviewed. Exam conducted with a chaperone present.   Constitutional:       Appearance: Normal appearance.   HENT:      Head: Normocephalic.      Right Ear: External ear normal.      Left Ear: External ear normal.      Mouth/Throat:      Mouth: Mucous membranes are dry.   Eyes:      Conjunctiva/sclera: Conjunctivae normal.      Pupils: Pupils are equal, round, and reactive to light.   Cardiovascular:      Rate and Rhythm: Normal rate and regular rhythm.   Pulmonary:      Effort: Pulmonary effort is normal.      Breath sounds: Normal breath sounds. No wheezing, rhonchi or rales.   Abdominal:      General: Bowel sounds are normal.      Palpations: Abdomen is soft.      Tenderness: There is no abdominal tenderness.   Genitourinary:     Pubic Area: Rash present.      Comments: Severe excoriation of the labia and perineal areas.  Skin:     General: Skin is warm.      Coloration: Skin is pale.    Neurological:      Mental Status: She is alert and oriented to person, place, and time.   Psychiatric:         Mood and Affect: Mood normal.      Comments: Slow but appropriate response to questions         Critical Care  Performed by: Yoli Barrera APRN  Authorized by: Gianni Johnson MD     Critical care provider statement:     Critical care time (minutes):  30    Critical care time was exclusive of:  Separately billable procedures and treating other patients    Critical care was necessary to treat or prevent imminent or life-threatening deterioration of the following conditions:  Circulatory failure, dehydration and renal failure    Critical care was time spent personally by me on the following activities:  Development of treatment plan with patient or surrogate, discussions with consultants, evaluation of patient's response to treatment, examination of patient, obtaining history from patient or surrogate, review of old charts, re-evaluation of patient's condition, pulse oximetry, ordering and review of radiographic studies, ordering and review of laboratory studies and ordering and performing treatments and interventions               ED Course      Recent Results (from the past 24 hour(s))   POC Glucose Once    Collection Time: 06/01/21  5:19 PM    Specimen: Blood   Result Value Ref Range    Glucose 176 (H) 70 - 130 mg/dL   Comprehensive Metabolic Panel    Collection Time: 06/01/21  5:36 PM    Specimen: Blood   Result Value Ref Range    Glucose 124 (H) 65 - 99 mg/dL    BUN 18 6 - 20 mg/dL    Creatinine 4.48 (H) 0.57 - 1.00 mg/dL    Sodium 107 (C) 136 - 145 mmol/L    Potassium 3.0 (L) 3.5 - 5.2 mmol/L    Chloride 64 (L) 98 - 107 mmol/L    CO2 16.0 (L) 22.0 - 29.0 mmol/L    Calcium 8.7 8.6 - 10.5 mg/dL    Total Protein 5.5 (L) 6.0 - 8.5 g/dL    Albumin 3.00 (L) 3.50 - 5.20 g/dL    ALT (SGPT) 453 (H) 1 - 33 U/L    AST (SGOT) 2,211 (H) 1 - 32 U/L    Alkaline Phosphatase 139 (H) 39 - 117 U/L    Total Bilirubin  6.7 (H) 0.0 - 1.2 mg/dL    eGFR Non African Amer 10 (L) >60 mL/min/1.73    eGFR  African Amer      Globulin 2.5 gm/dL    A/G Ratio 1.2 g/dL    BUN/Creatinine Ratio 4.0 (L) 7.0 - 25.0    Anion Gap 27.0 (H) 5.0 - 15.0 mmol/L   Troponin    Collection Time: 06/01/21  5:36 PM    Specimen: Blood   Result Value Ref Range    Troponin T 0.023 0.000 - 0.030 ng/mL   Magnesium    Collection Time: 06/01/21  5:36 PM    Specimen: Blood   Result Value Ref Range    Magnesium 1.6 1.6 - 2.6 mg/dL   Green Top (Gel)    Collection Time: 06/01/21  5:36 PM   Result Value Ref Range    Extra Tube Hold for add-ons.    Lavender Top    Collection Time: 06/01/21  5:36 PM   Result Value Ref Range    Extra Tube hold for add-on    Gold Top - SST    Collection Time: 06/01/21  5:36 PM   Result Value Ref Range    Extra Tube Hold for add-ons.    CBC Auto Differential    Collection Time: 06/01/21  5:36 PM    Specimen: Blood   Result Value Ref Range    WBC 12.97 (H) 3.40 - 10.80 10*3/mm3    RBC 2.45 (L) 3.77 - 5.28 10*6/mm3    Hemoglobin 8.6 (L) 12.0 - 15.9 g/dL    Hematocrit 25.0 (L) 34.0 - 46.6 %    .0 (H) 79.0 - 97.0 fL    MCH 35.1 (H) 26.6 - 33.0 pg    MCHC 34.4 31.5 - 35.7 g/dL    RDW 15.1 12.3 - 15.4 %    RDW-SD 55.8 (H) 37.0 - 54.0 fl    MPV 11.0 6.0 - 12.0 fL    Platelets 183 140 - 450 10*3/mm3   Ethanol    Collection Time: 06/01/21  5:36 PM    Specimen: Blood   Result Value Ref Range    Ethanol <10 0 - 10 mg/dL   Manual Differential    Collection Time: 06/01/21  5:36 PM    Specimen: Blood   Result Value Ref Range    Neutrophil % 65.0 42.7 - 76.0 %    Lymphocyte % 8.0 (L) 19.6 - 45.3 %    Monocyte % 6.0 5.0 - 12.0 %    Eosinophil % 0.0 (L) 0.3 - 6.2 %    Basophil % 0.0 0.0 - 1.5 %    Bands %  21.0 (H) 0.0 - 5.0 %    Neutrophils Absolute 11.15 (H) 1.70 - 7.00 10*3/mm3    Lymphocytes Absolute 1.04 0.70 - 3.10 10*3/mm3    Monocytes Absolute 0.78 0.10 - 0.90 10*3/mm3    Eosinophils Absolute 0.00 0.00 - 0.40 10*3/mm3    Basophils Absolute 0.00  0.00 - 0.20 10*3/mm3    RBC Morphology Normal Normal    Toxic Granulation Mod/2+ None Seen    Vacuolated Neutrophils Large/3+ None Seen    Platelet Morphology Normal Normal    Clumped Platelets     TSH Rfx On Abnormal To Free T4    Collection Time: 06/01/21  5:36 PM    Specimen: Blood   Result Value Ref Range    TSH 3.750 0.270 - 4.200 uIU/mL   Osmolality, Serum    Collection Time: 06/01/21  5:36 PM    Specimen: Blood   Result Value Ref Range    Osmolality 251 (L) 275 - 295 mOsm/kg   Lactic Acid, Plasma    Collection Time: 06/01/21  5:36 PM    Specimen: Blood   Result Value Ref Range    Lactate 9.7 (C) 0.5 - 2.0 mmol/L   TSH    Collection Time: 06/01/21  5:36 PM    Specimen: Blood   Result Value Ref Range    TSH 3.750 0.270 - 4.200 uIU/mL   Urine Drug Screen - Urine, Catheter    Collection Time: 06/01/21  7:33 PM    Specimen: Urine, Catheter   Result Value Ref Range    THC, Screen, Urine Negative Negative    Phencyclidine (PCP), Urine Negative Negative    Cocaine Screen, Urine Negative Negative    Methamphetamine, Ur Negative Negative    Opiate Screen Negative Negative    Amphetamine Screen, Urine Negative Negative    Benzodiazepine Screen, Urine Negative Negative    Tricyclic Antidepressants Screen Negative Negative    Methadone Screen, Urine Negative Negative    Barbiturates Screen, Urine Negative Negative    Oxycodone Screen, Urine Negative Negative    Propoxyphene Screen Negative Negative    Buprenorphine, Screen, Urine Negative Negative   Urinalysis With Culture If Indicated - Urine, Catheter In/Out    Collection Time: 06/01/21  7:33 PM    Specimen: Urine, Catheter In/Out   Result Value Ref Range    Color, UA Orange (A) Yellow, Straw    Appearance, UA Turbid (A) Clear    pH, UA 5.5 5.0 - 8.0    Specific Gravity, UA 1.013 1.001 - 1.030    Glucose, UA Negative Negative    Ketones, UA Trace (A) Negative    Bilirubin, UA Small (1+) (A) Negative    Blood, UA Small (1+) (A) Negative    Protein, UA 30 mg/dL (1+) (A)  Negative    Leuk Esterase, UA Large (3+) (A) Negative    Nitrite, UA Positive (A) Negative    Urobilinogen, UA 1.0 E.U./dL 0.2 - 1.0 E.U./dL   Urinalysis, Microscopic Only - Urine, Catheter In/Out    Collection Time: 06/01/21  7:33 PM    Specimen: Urine, Catheter In/Out   Result Value Ref Range    RBC, UA 0-2 None Seen, 0-2 /HPF    WBC, UA Too Numerous to Count (A) None Seen, 0-2 /HPF    Bacteria, UA 4+ (A) None Seen, Trace /HPF    Squamous Epithelial Cells, UA 3-6 (A) None Seen, 0-2 /HPF    Hyaline Casts, UA 0-6 0 - 6 /LPF    Methodology Manual Light Microscopy    Osmolality, Urine - Urine, Catheter In/Out    Collection Time: 06/01/21  7:33 PM    Specimen: Urine, Catheter In/Out   Result Value Ref Range    Osmolality, Urine 249 (L) 300-1,100 mOsm/kg   COVID-19, [facility] IN-HOUSE,NASAL Swab (NO TRANSPORT MEDIA) 2 HR TAT - Swab, Nasopharynx    Collection Time: 06/01/21  7:42 PM    Specimen: Nasopharynx; Swab   Result Value Ref Range    COVID19 Presumptive Negative Presumptive Negative - Ref. Range   POC Occult Blood Stool    Collection Time: 06/01/21  7:48 PM    Specimen: Per Rectum; Stool   Result Value Ref Range    Fecal Occult Blood Negative Negative    Lot Number [id number]     Expiration Date 10/23     DEVELOPER LOT NUMBER [id number]     DEVELOPER EXPIRATION DATE 2,022-10     Positive Control Positive Positive    Negative Control Negative Negative     Note: In addition to lab results from this visit, the labs listed above may include labs taken at another facility or during a different encounter within the last 24 hours. Please correlate lab times with ED admission and discharge times for further clarification of the services performed during this visit.    XR Chest 1 View   Preliminary Result   Cardiac size enlarged with increased central vascular   congestion however no overt edema or pleural effusion of decompensation   evident              CT Head Without Contrast   Preliminary Result   No acute intracranial  findings.              CT Abdomen Pelvis Without Contrast    (Results Pending)     Vitals:    06/01/21 2000 06/01/21 2020 06/01/21 2021 06/01/21 2022   BP: 116/41 92/52 92/52 102/64   BP Location:       Patient Position:   Lying Sitting   Pulse: 96 99 99 98   Resp:       Temp:       TempSrc:       SpO2: 100% 100% 100%    Weight:       Height:           ECG/EMG Results (last 24 hours)     Procedure Component Value Units Date/Time    ECG 12 Lead [[id number]] Collected: 06/01/21 1727     Updated: 06/01/21 1732        ECG 12 Lead             Spoke with Dr. Megan Carson with critical care and will admit to ICU.                                     MDM    Final diagnoses:   Acute renal failure, unspecified acute renal failure type (CMS/HCC)   Hyponatremia   Hypokalemia   Anemia, unspecified type   Alcoholism (CMS/HCC)       ED Disposition  ED Disposition     ED Disposition Condition Comment    Decision to Admit  Level of Care: Critical Care [6]   Admitting Physician: MEGAN CARSON [[id number]]   Patient Class: Inpatient [101]            No follow-up provider specified.       Medication List      No changes were made to your prescriptions during this visit.          Yoli Barrera APRN  06/01/21 2111       Yoli Barrera APRN  06/01/21 2113     motrin and/or tylenol as directed as needed for pain

## 2022-08-24 NOTE — ASU PATIENT PROFILE, ADULT - TEACHING/LEARNING DEVELOPMENTAL CONSIDERATIONS
Render Risk Assessment In Note?: no Additional Notes: reviewed risk of tanning and recommend no tanning Detail Level: Simple none

## 2022-08-24 NOTE — ASU PATIENT PROFILE, ADULT - FALL HARM RISK - RISK INTERVENTIONS

## 2022-08-24 NOTE — ASU DISCHARGE PLAN (ADULT/PEDIATRIC) - CARE PROVIDER_API CALL
Olga Huang (MD; MS)  Obstetrics and Gynecology  [address]  [place] [address]  Phone: [phone]  Fax: [phone]  Follow Up Time: 2 weeks

## 2022-08-24 NOTE — ASU PREOP CHECKLIST - PATIENT'S PERSONAL PROPERTY REMOVED
plastic piercing left ear and nose/glasses plastic piercing left ear and nose/glasses sent ot PACU/glasses

## 2022-08-24 NOTE — BRIEF OPERATIVE NOTE - NSICDXBRIEFPROCEDURE_GEN_ALL_CORE_FT
PROCEDURES:  Hysteroscopy, with dilation and curettage 24-Aug-2022 13:32:07  Santa Arevalo  Ablation, endometrial, thermal 24-Aug-2022 13:32:28  Santa Arevalo  IUD insertion 24-Aug-2022 13:32:38  Santa Arevalo

## 2022-08-24 NOTE — ASU PATIENT PROFILE, ADULT - FALL HARM RISK - UNIVERSAL INTERVENTIONS
Bed in lowest position, wheels locked, appropriate side rails in place/Call bell, personal items and telephone in reach/Instruct patient to call for assistance before getting out of bed or chair/Non-slip footwear when patient is out of bed/[place] to call system/Physically safe environment - no spills, clutter or unnecessary equipment/Purposeful Proactive Rounding/Room/bathroom lighting operational, light cord in reach

## 2022-08-24 NOTE — ASU PATIENT PROFILE, ADULT - VISION (WITH CORRECTIVE LENSES IF THE PATIENT USUALLY WEARS THEM):
Partially impaired: cannot see medication labels or newsprint, but can see obstacles in path, and the surrounding layout; can count fingers at arm's length eyeglasses/Normal vision: sees adequately in most situations; can see medication labels, newsprint

## 2022-08-24 NOTE — ASU DISCHARGE PLAN (ADULT/PEDIATRIC) - ACTIVITY LEVEL
3 weeks/No excercise/No heavy lifting/Nothing per vagina/No tub baths/No douching/No tampons/No intercourse

## 2022-08-24 NOTE — ASU DISCHARGE PLAN (ADULT/PEDIATRIC) - NURSING INSTRUCTIONS
Drink plenty of fluids. Take all medications as prescribed. Follow up with MD office for post-op appointment.

## 2022-08-26 LAB — SURGICAL PATHOLOGY STUDY: SIGNIFICANT CHANGE UP

## 2022-08-31 ENCOUNTER — APPOINTMENT (OUTPATIENT)
Dept: ENDOCRINOLOGY | Facility: CLINIC | Age: 42
End: 2022-08-31

## 2022-08-31 ENCOUNTER — APPOINTMENT (OUTPATIENT)
Dept: OBGYN | Facility: CLINIC | Age: 42
End: 2022-08-31

## 2022-08-31 VITALS
TEMPERATURE: 98 F | WEIGHT: 150 LBS | HEART RATE: 83 BPM | BODY MASS INDEX: 25.61 KG/M2 | HEIGHT: 64 IN | SYSTOLIC BLOOD PRESSURE: 110 MMHG | DIASTOLIC BLOOD PRESSURE: 70 MMHG | OXYGEN SATURATION: 98 %

## 2022-08-31 VITALS
DIASTOLIC BLOOD PRESSURE: 70 MMHG | TEMPERATURE: 98 F | HEART RATE: 94 BPM | WEIGHT: 150 LBS | SYSTOLIC BLOOD PRESSURE: 112 MMHG | OXYGEN SATURATION: 98 % | RESPIRATION RATE: 17 BRPM | BODY MASS INDEX: 25.61 KG/M2 | HEIGHT: 64 IN

## 2022-08-31 DIAGNOSIS — G89.18 OTHER ACUTE POSTPROCEDURAL PAIN: ICD-10-CM

## 2022-08-31 DIAGNOSIS — R79.89 OTHER SPECIFIED ABNORMAL FINDINGS OF BLOOD CHEMISTRY: ICD-10-CM

## 2022-08-31 DIAGNOSIS — E22.9 BENIGN NEOPLASM OF PITUITARY GLAND: ICD-10-CM

## 2022-08-31 DIAGNOSIS — D35.2 BENIGN NEOPLASM OF PITUITARY GLAND: ICD-10-CM

## 2022-08-31 PROCEDURE — 99214 OFFICE O/P EST MOD 30 MIN: CPT

## 2022-08-31 PROCEDURE — 99213 OFFICE O/P EST LOW 20 MIN: CPT | Mod: 24

## 2022-08-31 NOTE — HISTORY OF PRESENT ILLNESS
[FreeTextEntry1] : 42 year female  f/u for pituitary evaluation \par \par *** Aug 31, 2022 ***\par \par s/p breast reduction sx in 5/22. no more galactorrhea since then. \par still with occasional  temporal/occipital areas headaches, not more frequent but more shooting resembling migraines. BP is normal, but vision is worse- needs reading glasses\par has not seen a neurologist\par s/p recent D&C for heavy bleeding\par \par last pit w/up in 05/22 - normal\par \par \par HPI:\par Ms. YUSUF  was undergoing a preoperative evaluation for her breast reduction, and as a part of her blood work she was found to have a a slightly elevated prolactin level. She's been complaining of the irregular menstrual cycle for the past year or so, as well temporal/occipital areas headaches, occasional right breast minimal milky discharge. Her vision gets blurry when she has headaches. Her weight has been stable, always with a cold intolerance (was treated for anemia), denies new purple stretch marks or easy bruising ,  jaw protruding, change in facial features or change in shoe/hat/ring size. Also, she denies diaphoresis or sweating, chest pain or palpitations, SOB, diarrhea or constipation, anxiety or tremor, muscle aches. Her cousin was also diagnosed with a macroadenoma, requiring TSSR.\par \par Pituitary MRI (5/10/2022)–0.5 x 0.5 x 0.6 cm pituitary microadenoma abutting the cavernous segment of the right ICA without encasement or invasion.  There is a second smaller lesion in the left pituitary gland measuring 0.4 x 0.4 cm.  No mass-effect upon the optic chiasm.  Slight leftward deviation of the pituitary stalk.\par

## 2022-08-31 NOTE — ASSESSMENT
[FreeTextEntry1] : Pituitary microadenoma\par - will repeat fasting am pituitary panel\par - if prolactin returns again elevated, given her symptoms, might consider a small dose of DA\par - neurology evaluation\par - repeat an early pituitary MRI in 09/22-10/22\par If stable , can follow up in 6 months

## 2022-09-01 PROBLEM — Z86.16 PERSONAL HISTORY OF COVID-19: Chronic | Status: ACTIVE | Noted: 2022-08-16

## 2022-09-01 PROBLEM — R79.89 OTHER SPECIFIED ABNORMAL FINDINGS OF BLOOD CHEMISTRY: Chronic | Status: ACTIVE | Noted: 2022-05-11

## 2022-10-11 PROBLEM — G89.18 POST-OPERATIVE PAIN: Status: ACTIVE | Noted: 2022-10-11

## 2022-10-11 NOTE — PLAN
[FreeTextEntry1] : \par \par I spent the time noted on the day of this patient encounter preparing for, providing and documenting the above service. I have  counseled and educated the patient on the differential, workup, disease course, and treatment/management plan. Education was provided to the patient during this encounter. All questions and concerns were answered and addressed in detail.\par

## 2022-10-11 NOTE — PHYSICAL EXAM
[Appropriately responsive] : appropriately responsive [Alert] : alert [No Acute Distress] : no acute distress [Labia Majora] : normal [Labia Minora] : normal [IUD String] : an IUD string was noted [Normal] : normal [Uterine Adnexae] : normal [FreeTextEntry5] : trimmed

## 2022-11-02 ENCOUNTER — NON-APPOINTMENT (OUTPATIENT)
Age: 42
End: 2022-11-02

## 2022-11-02 DIAGNOSIS — R10.9 UNSPECIFIED ABDOMINAL PAIN: ICD-10-CM

## 2022-11-02 RX ORDER — PANTOPRAZOLE 40 MG/1
40 TABLET, DELAYED RELEASE ORAL DAILY
Qty: 90 | Refills: 3 | Status: ACTIVE | COMMUNITY
Start: 2021-06-24 | End: 1900-01-01

## 2022-11-03 ENCOUNTER — APPOINTMENT (OUTPATIENT)
Dept: ULTRASOUND IMAGING | Facility: CLINIC | Age: 42
End: 2022-11-03

## 2022-11-03 PROCEDURE — 76700 US EXAM ABDOM COMPLETE: CPT

## 2022-11-08 ENCOUNTER — APPOINTMENT (OUTPATIENT)
Dept: INTERNAL MEDICINE | Facility: CLINIC | Age: 42
End: 2022-11-08

## 2022-11-10 ENCOUNTER — EMERGENCY (EMERGENCY)
Facility: HOSPITAL | Age: 42
LOS: 1 days | Discharge: HOME | End: 2022-11-10
Attending: EMERGENCY MEDICINE | Admitting: EMERGENCY MEDICINE
Payer: COMMERCIAL

## 2022-11-10 VITALS
HEIGHT: 65.5 IN | RESPIRATION RATE: 20 BRPM | SYSTOLIC BLOOD PRESSURE: 111 MMHG | WEIGHT: 150.36 LBS | DIASTOLIC BLOOD PRESSURE: 55 MMHG | HEART RATE: 103 BPM | TEMPERATURE: 99 F | OXYGEN SATURATION: 98 %

## 2022-11-10 DIAGNOSIS — Z98.890 OTHER SPECIFIED POSTPROCEDURAL STATES: Chronic | ICD-10-CM

## 2022-11-10 DIAGNOSIS — Z90.49 ACQUIRED ABSENCE OF OTHER SPECIFIED PARTS OF DIGESTIVE TRACT: Chronic | ICD-10-CM

## 2022-11-10 LAB
ALBUMIN SERPL ELPH-MCNC: 3.6 G/DL — SIGNIFICANT CHANGE UP (ref 3.3–5)
ALP SERPL-CCNC: 67 U/L — SIGNIFICANT CHANGE UP (ref 40–120)
ALT FLD-CCNC: 20 U/L — SIGNIFICANT CHANGE UP (ref 12–78)
ANION GAP SERPL CALC-SCNC: 6 MMOL/L — SIGNIFICANT CHANGE UP (ref 5–17)
AST SERPL-CCNC: 23 U/L — SIGNIFICANT CHANGE UP (ref 15–37)
BASOPHILS # BLD AUTO: 0.01 K/UL — SIGNIFICANT CHANGE UP (ref 0–0.2)
BASOPHILS NFR BLD AUTO: 0.1 % — SIGNIFICANT CHANGE UP (ref 0–2)
BILIRUB SERPL-MCNC: 0.8 MG/DL — SIGNIFICANT CHANGE UP (ref 0.2–1.2)
BUN SERPL-MCNC: 11 MG/DL — SIGNIFICANT CHANGE UP (ref 7–23)
CALCIUM SERPL-MCNC: 8.4 MG/DL — LOW (ref 8.5–10.1)
CHLORIDE SERPL-SCNC: 109 MMOL/L — HIGH (ref 96–108)
CO2 SERPL-SCNC: 22 MMOL/L — SIGNIFICANT CHANGE UP (ref 22–31)
CREAT SERPL-MCNC: 0.72 MG/DL — SIGNIFICANT CHANGE UP (ref 0.5–1.3)
EGFR: 106 ML/MIN/1.73M2 — SIGNIFICANT CHANGE UP
EOSINOPHIL # BLD AUTO: 0.02 K/UL — SIGNIFICANT CHANGE UP (ref 0–0.5)
EOSINOPHIL NFR BLD AUTO: 0.2 % — SIGNIFICANT CHANGE UP (ref 0–6)
FLUAV AG NPH QL: SIGNIFICANT CHANGE UP
FLUBV AG NPH QL: SIGNIFICANT CHANGE UP
GLUCOSE SERPL-MCNC: 96 MG/DL — SIGNIFICANT CHANGE UP (ref 70–99)
HCG SERPL-ACNC: <1 MIU/ML — SIGNIFICANT CHANGE UP
HCT VFR BLD CALC: 38 % — SIGNIFICANT CHANGE UP (ref 34.5–45)
HGB BLD-MCNC: 11.7 G/DL — SIGNIFICANT CHANGE UP (ref 11.5–15.5)
IMM GRANULOCYTES NFR BLD AUTO: 0.3 % — SIGNIFICANT CHANGE UP (ref 0–0.9)
LACTATE SERPL-SCNC: 1.4 MMOL/L — SIGNIFICANT CHANGE UP (ref 0.7–2)
LIDOCAIN IGE QN: 59 U/L — LOW (ref 73–393)
LYMPHOCYTES # BLD AUTO: 0.81 K/UL — LOW (ref 1–3.3)
LYMPHOCYTES # BLD AUTO: 9.2 % — LOW (ref 13–44)
MCHC RBC-ENTMCNC: 24.2 PG — LOW (ref 27–34)
MCHC RBC-ENTMCNC: 30.8 GM/DL — LOW (ref 32–36)
MCV RBC AUTO: 78.7 FL — LOW (ref 80–100)
MONOCYTES # BLD AUTO: 0.49 K/UL — SIGNIFICANT CHANGE UP (ref 0–0.9)
MONOCYTES NFR BLD AUTO: 5.5 % — SIGNIFICANT CHANGE UP (ref 2–14)
NEUTROPHILS # BLD AUTO: 7.48 K/UL — HIGH (ref 1.8–7.4)
NEUTROPHILS NFR BLD AUTO: 84.7 % — HIGH (ref 43–77)
NRBC # BLD: 0 /100 WBCS — SIGNIFICANT CHANGE UP (ref 0–0)
PLATELET # BLD AUTO: 506 K/UL — HIGH (ref 150–400)
POTASSIUM SERPL-MCNC: 3.9 MMOL/L — SIGNIFICANT CHANGE UP (ref 3.5–5.3)
POTASSIUM SERPL-SCNC: 3.9 MMOL/L — SIGNIFICANT CHANGE UP (ref 3.5–5.3)
PROT SERPL-MCNC: 7.8 G/DL — SIGNIFICANT CHANGE UP (ref 6–8.3)
RBC # BLD: 4.83 M/UL — SIGNIFICANT CHANGE UP (ref 3.8–5.2)
RBC # FLD: 16.8 % — HIGH (ref 10.3–14.5)
RSV RNA NPH QL NAA+NON-PROBE: SIGNIFICANT CHANGE UP
SARS-COV-2 RNA SPEC QL NAA+PROBE: SIGNIFICANT CHANGE UP
SODIUM SERPL-SCNC: 137 MMOL/L — SIGNIFICANT CHANGE UP (ref 135–145)
TROPONIN I, HIGH SENSITIVITY RESULT: 4.6 NG/L — SIGNIFICANT CHANGE UP
WBC # BLD: 8.84 K/UL — SIGNIFICANT CHANGE UP (ref 3.8–10.5)
WBC # FLD AUTO: 8.84 K/UL — SIGNIFICANT CHANGE UP (ref 3.8–10.5)

## 2022-11-10 PROCEDURE — 71045 X-RAY EXAM CHEST 1 VIEW: CPT | Mod: 26

## 2022-11-10 PROCEDURE — 99285 EMERGENCY DEPT VISIT HI MDM: CPT

## 2022-11-10 RX ORDER — FAMOTIDINE 10 MG/ML
1 INJECTION INTRAVENOUS
Qty: 0 | Refills: 0 | DISCHARGE

## 2022-11-10 RX ORDER — RADIOPAQUE PVC MARKERS/BARIUM 24MARKERS
900 CAPSULE ORAL ONCE
Refills: 0 | Status: DISCONTINUED | OUTPATIENT
Start: 2022-11-10 | End: 2022-11-10

## 2022-11-10 RX ORDER — ACETAMINOPHEN 500 MG
2 TABLET ORAL
Qty: 0 | Refills: 0 | DISCHARGE

## 2022-11-10 RX ORDER — IBUPROFEN 200 MG
2 TABLET ORAL
Qty: 0 | Refills: 0 | DISCHARGE

## 2022-11-10 RX ORDER — ONDANSETRON 8 MG/1
4 TABLET, FILM COATED ORAL ONCE
Refills: 0 | Status: COMPLETED | OUTPATIENT
Start: 2022-11-10 | End: 2022-11-10

## 2022-11-10 RX ORDER — HYDROMORPHONE HYDROCHLORIDE 2 MG/ML
1 INJECTION INTRAMUSCULAR; INTRAVENOUS; SUBCUTANEOUS ONCE
Refills: 0 | Status: DISCONTINUED | OUTPATIENT
Start: 2022-11-10 | End: 2022-11-10

## 2022-11-10 RX ORDER — SODIUM CHLORIDE 9 MG/ML
1000 INJECTION INTRAMUSCULAR; INTRAVENOUS; SUBCUTANEOUS ONCE
Refills: 0 | Status: COMPLETED | OUTPATIENT
Start: 2022-11-10 | End: 2022-11-10

## 2022-11-10 RX ORDER — DIATRIZOATE MEGLUMINE 180 MG/ML
30 INJECTION, SOLUTION INTRAVESICAL ONCE
Refills: 0 | Status: COMPLETED | OUTPATIENT
Start: 2022-11-10 | End: 2022-11-10

## 2022-11-10 RX ORDER — MORPHINE SULFATE 50 MG/1
4 CAPSULE, EXTENDED RELEASE ORAL ONCE
Refills: 0 | Status: DISCONTINUED | OUTPATIENT
Start: 2022-11-10 | End: 2022-11-10

## 2022-11-10 RX ORDER — PANTOPRAZOLE SODIUM 20 MG/1
1 TABLET, DELAYED RELEASE ORAL
Qty: 0 | Refills: 0 | DISCHARGE

## 2022-11-10 RX ORDER — FAMOTIDINE 10 MG/ML
20 INJECTION INTRAVENOUS ONCE
Refills: 0 | Status: COMPLETED | OUTPATIENT
Start: 2022-11-10 | End: 2022-11-10

## 2022-11-10 RX ADMIN — ONDANSETRON 4 MILLIGRAM(S): 8 TABLET, FILM COATED ORAL at 22:10

## 2022-11-10 RX ADMIN — DIATRIZOATE MEGLUMINE 30 MILLILITER(S): 180 INJECTION, SOLUTION INTRAVESICAL at 22:11

## 2022-11-10 RX ADMIN — FAMOTIDINE 20 MILLIGRAM(S): 10 INJECTION INTRAVENOUS at 22:11

## 2022-11-10 RX ADMIN — SODIUM CHLORIDE 1000 MILLILITER(S): 9 INJECTION INTRAMUSCULAR; INTRAVENOUS; SUBCUTANEOUS at 23:14

## 2022-11-10 RX ADMIN — SODIUM CHLORIDE 1000 MILLILITER(S): 9 INJECTION INTRAMUSCULAR; INTRAVENOUS; SUBCUTANEOUS at 22:11

## 2022-11-10 RX ADMIN — MORPHINE SULFATE 4 MILLIGRAM(S): 50 CAPSULE, EXTENDED RELEASE ORAL at 22:25

## 2022-11-10 RX ADMIN — HYDROMORPHONE HYDROCHLORIDE 1 MILLIGRAM(S): 2 INJECTION INTRAMUSCULAR; INTRAVENOUS; SUBCUTANEOUS at 23:13

## 2022-11-10 RX ADMIN — MORPHINE SULFATE 4 MILLIGRAM(S): 50 CAPSULE, EXTENDED RELEASE ORAL at 22:10

## 2022-11-10 NOTE — ED PROVIDER NOTE - PROGRESS NOTE DETAILS
CT shows gastritis  Pt informed of results  Pt reports she has hx of gastritis and takes protonix and pepcid. Needs script for pepcid  Advised f/u with GI this week

## 2022-11-10 NOTE — ED ADULT NURSE NOTE - NSFALLRSKOUTCOME_ED_ALL_ED
[FreeTextEntry1] : General:\par The patient was alert and oriented and in no distress.\par Voice was clear.\par \par Face:\par The patient had no facial asymmetry or mass.\par The skin was unremarkable.\par \par Oral cavity:\par The oral mucosa was normal.\par The oral and base of tongue were clear and without mass.\par The gingival and buccal mucosa were moist and without lesions.\par The palate moved well.\par There was no cleft to the palate.\par There appeared to be good salivary flow.  \par There was no pus, erythema or mass in the oral cavity.\par \par Neck: \par The neck was symmetrical.\par The parotid and submandibular glands were normal without masses.\par The trachea was midline and there was no unusual crepitus.\par The thyroid was smooth and nontender and no masses were palpated.\par There was no significant cervical adenopathy.\par \par Neuro:\par Neurologically, the patient was awake, alert, and oriented to person, place and time. There were no obvious focal neurologic abnormalities.  Cranial nerves II through XII were grossly intact.\par \par Otoneuro:\par No significant nystagmus was noted.  He has the left-sided strabismus\par Finger nose finger and rapid alternating motions were normal.\par Romberg testing was normal.\par Misha-Hallpike maneuver was negative.\par There was no bruit or thrill in the neck.\par \par Ears:\par The external ears were normal without deformity.\par The ear canals were clear.\par The tympanic membranes were intact and normal.\par She has a small monomer on the right\par \par  [de-identified] : Nasal endoscopy:\par \par Nasal endoscopy was done with topical anesthesia and a flexible endoscope to evaluate for nasal polyps, chronic sinusitis and response to therapy.\par Endocoscopy was performed to inspect the interior of the nasal cavity, the nasal septum,  the middle and superior meati, the inferior, middle and superior turbinates, and the spheno-ethmoidal  recesses, the nasopharynx and eustachian tube orifices bilaterally\par \par Endoscopy was done with Covid precautions and with video. All risks and benefits were discussed with the patient and consent obtained.\par \par \par Evaluation showed that the septum was midline.  There was no significant mucosal disease.  The inferior turbinates and middle turbinates were normal.  On both sides, the surgically opened ethmoids, antra, and frontal recesses were clear.  There was no evidence of polypoid recurrences and no purulence.  The mucosa was close to stage zero.  The nasopharynx was benign.    The middle and  superior meati were normal and the sphenoethmoidal recesses were without evidence of disease \par \par specifically, the left antral purulence and completely resolved\par \par  Universal Safety Interventions

## 2022-11-10 NOTE — ED PROVIDER NOTE - OBJECTIVE STATEMENT
41 yo F presents c/o 8-9 day hx of epigastric abd pain that occasionally radiates to back, associated with nausea, and today 8-9 episode of vomiting today. States pain feels like strong cramps. Denies fever/chills. Reports small BM today which was like diarrhea, no blood. H/o cholecystectomy.

## 2022-11-10 NOTE — ED PROVIDER NOTE - PATIENT PORTAL LINK FT
You can access the FollowMyHealth Patient Portal offered by [facility] by registering at the following website: [email or web address]. By joining [employer]s FollowMyHealth portal, you will also be able to view your health information using other applications (apps) compatible with our system.

## 2022-11-10 NOTE — ED ADULT NURSE NOTE - OBJECTIVE STATEMENT
Pt. received alert and oriented x3 with chief complaint of intermittent midline abdominal pain for 1 week worsening today w/ multiple episodes of N/V. Pt. states hx of gallbladder removal/intestinal obstruction.

## 2022-11-10 NOTE — ED PROVIDER NOTE - CLINICAL SUMMARY MEDICAL DECISION MAKING FREE TEXT BOX
41 yo F with epigastric pain N/V. Evaluate for pancreatitis, ACS (less likely, low risk), r/o obstruction, UTI, PUD.    -labs  -CXR  -CT A/P  -pain management, IVFs, zofran, pepcid  -Reassess

## 2022-11-10 NOTE — ED PROVIDER NOTE - NSFOLLOWUPINSTRUCTIONS_ED_ALL_ED_FT
Follow up with your GI doctor this week  Return to the ER if symptoms worsen                                                                                                                                    Gastritis, Adult    Outline of an adult's lower body with a close-up of the stomach, showing inflammation and an ulcer inside the stomach.    Gastritis is inflammation of the stomach. There are two kinds of gastritis:  •Acute gastritis. This kind develops suddenly.      •Chronic gastritis. This kind is much more common. It develops slowly and lasts for a long time.      Gastritis happens when the lining of the stomach becomes weak or gets damaged. Without treatment, gastritis can lead to stomach bleeding and ulcers.      What are the causes?    This condition may be caused by:  •An infection.      •Drinking too much alcohol.      •Certain medicines. These include steroids, antibiotics, and some over-the-counter medicines, such as aspirin or ibuprofen.      •Having too much acid in the stomach.      •Having a disease of the stomach.      Other causes may include:  •An allergic reaction.      •Some cancer treatments (radiation).      •Smoking cigarettes or the use of products that contain nicotine or tobacco.      In some cases, the cause of this condition is not known.      What increases the risk?    •Having a disease of the intestines.      •Having a disease in which the body's immune system attacks the body (autoimmune disease), such as Crohn's disease.      •Using aspirin or ibuprofen and other NSAIDs to treat other conditions, such as heart disease or chronic pain.      •Stress.        What are the signs or symptoms?    Symptoms of this condition include:  •Pain or a burning sensation in the upper abdomen.      •Nausea.      •Vomiting.      •An uncomfortable feeling of fullness after eating.      •Weight loss.      •Bad breath.      •Blood in your vomit or stool (feces).      In some cases, there are no symptoms.      How is this diagnosed?    This condition may be diagnosed based on your medical history, a physical exam, and tests. Tests may include:  •Your medical history and a description of your symptoms.      •A physical exam.    •Tests. These can include:  •Blood tests.      •Stool tests.      •A test in which a thin, flexible instrument with a light and a camera is passed down the esophagus and into the stomach (upper endoscopy).      •A test in which a tissue sample is removed to look at it under a microscope (biopsy).          How is this treated?    This condition may be treated with medicines. The medicines that are used vary depending on the cause of the gastritis.  •If the condition is caused by a bacterial infection, you may be given antibiotic medicines.      •If the condition is caused by too much acid in the stomach, you may be given medicines called H2 blockers, proton pump inhibitors, or antacids.      Treatment may also involve stopping the use of certain medicines such as aspirin or ibuprofen and other NSAIDs.      Follow these instructions at home:    Medicines     •Take over-the-counter and prescription medicines only as told by your health care provider.      •If you were prescribed an antibiotic medicine, take it as told by your health care provider. Do not stop taking the antibiotic even if you start to feel better.      Alcohol use   • Do not drink alcohol if:  •Your health care provider tells you not to drink.      •You are pregnant, may be pregnant, or are planning to become pregnant.      •If you drink alcohol:•Limit your use to:  •0–1 drink a day for women.      •0–2 drinks a day for men.        •Know how much alcohol is in your drink. In the [place] one drink equals one 12 oz bottle of beer (355 mL), one 5 oz glass of wine (148 mL), or one 1½ oz glass of hard liquor (44 mL).          General instructions   A comparison of three sample cups showing dark yellow, yellow, and pale yellow urine.   •Eat small, frequent meals instead of large meals.      •Avoid foods and drinks that make your symptoms worse.      •Talk with your health care provider about ways to manage stress, such as getting regular exercise or practicing deep breathing, meditation, or yoga.      • Do not use any products that contain nicotine or tobacco. These products include cigarettes, chewing tobacco, and vaping devices, such as e-cigarettes. If you need help quitting, ask your health care provider.      •Drink enough fluid to keep your urine pale yellow.      •Keep all follow-up visits. This is important.        Contact a health care provider if:    •Your symptoms get worse.      •Your abdominal pain gets worse.      •Your symptoms return after treatment.      •You have a fever.        Get help right away if:    •You vomit blood or a substance that looks like coffee grounds.      •You have black or dark red stools.      •You are unable to keep fluids down.      These symptoms may represent a serious problem that is an emergency. Do not wait to see if the symptoms will go away. Get medical help right away. Call your local emergency services (911 in the [place] Do not drive yourself to the hospital.       Summary    •Gastritis is inflammation of the lining of the stomach that can occur suddenly (acute) or develop slowly over time (chronic).      •This condition is diagnosed with a medical history, a physical exam, or tests.      •This condition may be treated with medicines to treat infection or medicines to reduce the amount of acid in your stomach.      •Follow your health care provider's instructions about taking medicines, making changes to your diet, and knowing when to call for help.      This information is not intended to replace advice given to you by your health care provider. Make sure you discuss any questions you have with your health care provider.      Document Revised: 04/23/2022 Document Reviewed: 04/23/2022    Elsevier Patient Education © 2022 [employer]

## 2022-11-10 NOTE — ED ADULT TRIAGE NOTE - CHIEF COMPLAINT QUOTE
41 y/o female received ambulatory to triage. Alert and oriented x4. C/o epigastric abdominal pain/cramping since last wednesday. Pt c/o n/v. Pt states "I have not had a BM since last week, it was only a small amount." Pt denies any cp, sob, dizziness, headache, fever/chills.

## 2022-11-11 VITALS
SYSTOLIC BLOOD PRESSURE: 115 MMHG | OXYGEN SATURATION: 98 % | HEART RATE: 86 BPM | DIASTOLIC BLOOD PRESSURE: 62 MMHG | TEMPERATURE: 98 F | RESPIRATION RATE: 20 BRPM

## 2022-11-11 LAB
APPEARANCE UR: ABNORMAL
BILIRUB UR-MCNC: ABNORMAL
COLOR SPEC: YELLOW — SIGNIFICANT CHANGE UP
DIFF PNL FLD: ABNORMAL
GLUCOSE UR QL: NEGATIVE — SIGNIFICANT CHANGE UP
KETONES UR-MCNC: ABNORMAL
LEUKOCYTE ESTERASE UR-ACNC: NEGATIVE — SIGNIFICANT CHANGE UP
NITRITE UR-MCNC: NEGATIVE — SIGNIFICANT CHANGE UP
PH UR: 5 — SIGNIFICANT CHANGE UP (ref 5–8)
PROT UR-MCNC: 15
SP GR SPEC: 1.02 — SIGNIFICANT CHANGE UP (ref 1.01–1.02)
UROBILINOGEN FLD QL: 1

## 2022-11-11 PROCEDURE — 83690 ASSAY OF LIPASE: CPT

## 2022-11-11 PROCEDURE — 81001 URINALYSIS AUTO W/SCOPE: CPT

## 2022-11-11 PROCEDURE — 96374 THER/PROPH/DIAG INJ IV PUSH: CPT

## 2022-11-11 PROCEDURE — 87086 URINE CULTURE/COLONY COUNT: CPT

## 2022-11-11 PROCEDURE — 96361 HYDRATE IV INFUSION ADD-ON: CPT

## 2022-11-11 PROCEDURE — 71045 X-RAY EXAM CHEST 1 VIEW: CPT

## 2022-11-11 PROCEDURE — 83605 ASSAY OF LACTIC ACID: CPT

## 2022-11-11 PROCEDURE — 87637 SARSCOV2&INF A&B&RSV AMP PRB: CPT

## 2022-11-11 PROCEDURE — 84702 CHORIONIC GONADOTROPIN TEST: CPT

## 2022-11-11 PROCEDURE — 84484 ASSAY OF TROPONIN QUANT: CPT

## 2022-11-11 PROCEDURE — 96375 TX/PRO/DX INJ NEW DRUG ADDON: CPT

## 2022-11-11 PROCEDURE — 85025 COMPLETE CBC W/AUTO DIFF WBC: CPT

## 2022-11-11 PROCEDURE — 74176 CT ABD & PELVIS W/O CONTRAST: CPT | Mod: 26,MA

## 2022-11-11 PROCEDURE — 74176 CT ABD & PELVIS W/O CONTRAST: CPT | Mod: MA

## 2022-11-11 PROCEDURE — 80053 COMPREHEN METABOLIC PANEL: CPT

## 2022-11-11 PROCEDURE — 99284 EMERGENCY DEPT VISIT MOD MDM: CPT | Mod: 25

## 2022-11-11 PROCEDURE — 36415 COLL VENOUS BLD VENIPUNCTURE: CPT

## 2022-11-11 RX ORDER — ONDANSETRON 8 MG/1
1 TABLET, FILM COATED ORAL
Qty: 20 | Refills: 0
Start: 2022-11-11 | End: 2022-11-15

## 2022-11-11 RX ORDER — FAMOTIDINE 10 MG/ML
1 INJECTION INTRAVENOUS
Qty: 60 | Refills: 0
Start: 2022-11-11 | End: 2022-12-10

## 2022-11-12 LAB
CULTURE RESULTS: SIGNIFICANT CHANGE UP
SPECIMEN SOURCE: SIGNIFICANT CHANGE UP

## 2022-11-16 ENCOUNTER — APPOINTMENT (OUTPATIENT)
Dept: INTERNAL MEDICINE | Facility: CLINIC | Age: 42
End: 2022-11-16

## 2022-11-20 ENCOUNTER — OUTPATIENT (OUTPATIENT)
Dept: OUTPATIENT SERVICES | Facility: HOSPITAL | Age: 42
LOS: 1 days | End: 2022-11-20
Payer: COMMERCIAL

## 2022-11-20 ENCOUNTER — APPOINTMENT (OUTPATIENT)
Dept: MRI IMAGING | Facility: CLINIC | Age: 42
End: 2022-11-20

## 2022-11-20 DIAGNOSIS — Z98.890 OTHER SPECIFIED POSTPROCEDURAL STATES: Chronic | ICD-10-CM

## 2022-11-20 DIAGNOSIS — D35.2 BENIGN NEOPLASM OF PITUITARY GLAND: ICD-10-CM

## 2022-11-20 DIAGNOSIS — R79.89 OTHER SPECIFIED ABNORMAL FINDINGS OF BLOOD CHEMISTRY: ICD-10-CM

## 2022-11-20 DIAGNOSIS — Z00.8 ENCOUNTER FOR OTHER GENERAL EXAMINATION: ICD-10-CM

## 2022-11-20 DIAGNOSIS — Z90.49 ACQUIRED ABSENCE OF OTHER SPECIFIED PARTS OF DIGESTIVE TRACT: Chronic | ICD-10-CM

## 2022-11-20 PROCEDURE — A9585: CPT

## 2022-11-20 PROCEDURE — 70553 MRI BRAIN STEM W/O & W/DYE: CPT | Mod: 26

## 2022-11-20 PROCEDURE — 70553 MRI BRAIN STEM W/O & W/DYE: CPT

## 2022-11-22 ENCOUNTER — APPOINTMENT (OUTPATIENT)
Dept: INTERNAL MEDICINE | Facility: CLINIC | Age: 42
End: 2022-11-22

## 2022-11-22 ENCOUNTER — APPOINTMENT (OUTPATIENT)
Dept: ORTHOPEDIC SURGERY | Facility: CLINIC | Age: 42
End: 2022-11-22

## 2022-11-22 VITALS
SYSTOLIC BLOOD PRESSURE: 120 MMHG | DIASTOLIC BLOOD PRESSURE: 83 MMHG | HEIGHT: 64 IN | BODY MASS INDEX: 25.61 KG/M2 | HEART RATE: 92 BPM | WEIGHT: 150 LBS

## 2022-11-22 VITALS — WEIGHT: 150 LBS | HEIGHT: 64 IN | BODY MASS INDEX: 25.61 KG/M2

## 2022-11-22 DIAGNOSIS — R22.2 LOCALIZED SWELLING, MASS AND LUMP, TRUNK: ICD-10-CM

## 2022-11-22 DIAGNOSIS — M54.12 RADICULOPATHY, CERVICAL REGION: ICD-10-CM

## 2022-11-22 DIAGNOSIS — K25.9 GASTRIC ULCER, UNSPECIFIED AS ACUTE OR CHRONIC, W/OUT HEMORRHAGE OR PERFORATION: ICD-10-CM

## 2022-11-22 PROCEDURE — 99213 OFFICE O/P EST LOW 20 MIN: CPT

## 2022-11-22 PROCEDURE — 99214 OFFICE O/P EST MOD 30 MIN: CPT

## 2022-11-22 RX ORDER — FAMOTIDINE 40 MG/1
40 TABLET, FILM COATED ORAL
Qty: 30 | Refills: 6 | Status: ACTIVE | COMMUNITY
Start: 2022-11-22 | End: 1900-01-01

## 2022-11-22 NOTE — HISTORY OF PRESENT ILLNESS
[Neck] : neck [Upper back] : upper back [Mid-back] : mid-back [Gradual] : gradual [5] : 5 [10] : 10 [Dull/Aching] : dull/aching [Constant] : constant [Sleep] : sleep [Heat] : heat [Lying in bed] : lying in bed [de-identified] : ChaitanyaJack is a 42 year old female presenting with neck and thoracic pain. Also has a bump in mid back for x3 months.  has had CESIs in the past with excellent relief after the first one but marginal relief after the second;l the first one lasted for almost one year;  the second one was administered about three years ago;  had MRI cervical from 2.5 years ago  [] : no [FreeTextEntry5] : Pt states she is experiencing neck pain and thoracic pain. There is a bump on the right side of mid back but is not painful. [FreeTextEntry7] : Down both arms

## 2022-11-22 NOTE — IMAGING
[de-identified] : + spurling\par motor 5/5\par dermatome pain C6\par reflexes symmetric\par motor 5/5\par sensation I LT\par \par thoracic/torso:  right sided mass/lump mobile but not freely mobile between the scapula and T spine;  no overlying skin abnormality; no dimpling nor orange peel appearance.  nontender

## 2022-11-22 NOTE — PHYSICAL EXAM
[Alert] : alert [Normal Voice/Communication] : normal voice/communication [Healthy Appearing] : healthy appearing [No Acute Distress] : no acute distress [Sclera] : the sclera and conjunctiva were normal [Hearing Threshold Finger Rub Not [place]] : hearing was normal [Normal Lips/Gums] : the lips and gums were normal [Oropharynx] : the oropharynx was normal [Normal Appearance] : the appearance of the neck was normal [No Neck Mass] : no neck mass was observed [No Respiratory Distress] : no respiratory distress [No Acc Muscle Use] : no accessory muscle use [Respiration, Rhythm And Depth] : normal respiratory rhythm and effort [Auscultation Breath Sounds / Voice Sounds] : lungs were clear to auscultation bilaterally [Normal S1, S2] : normal S1 and S2 [Heart Rate And Rhythm] : heart rate was normal and rhythm regular [Murmurs] : no murmurs [Bowel Sounds] : normal bowel sounds [Abdomen Tenderness] : non-tender [No Masses] : no abdominal mass palpated [Abdomen Soft] : soft [] : no hepatosplenomegaly [Oriented To Time, Place, And Person] : oriented to person, place, and time

## 2022-11-22 NOTE — HISTORY OF PRESENT ILLNESS
[FreeTextEntry1] : Went To ER 11/10/22 epigastric pain.  Had CT scan c/w gastritis. \par  she had stopped pantoprazole . started  after taking a mobic from her mom\par  she had EGD 6/2021 - no H. Pylori was found. but positive for chronic active gastritis

## 2022-11-22 NOTE — ASSESSMENT
[FreeTextEntry1] : continue pantoprazole. repeat EGD. need to stop pantoprazole 2 weeks prior to EGD and use famotidine 40

## 2022-11-22 NOTE — ASSESSMENT
[FreeTextEntry1] : went over CT and MRI cervical from 2.5 years ago;  at 5-6 there is loss of disc height and foraminal stenosis;  we need a current study to re evaluate it prior to repeat BÁRBARA and or PT;  since those measures in the past were of marignal benefit she may opt for surgery but we need to discuss how that fits in when she follows up after the MRI C spine;\par \par in addition , the mass/lump needs further workup;  it has grown in size over the last three months and is at times tender and it's not freely mobile;  so a MRI of her thorax will be ordere and the area of interest is the subcutaneous compartment on the right side between her scapula and the spine;

## 2022-11-23 ENCOUNTER — APPOINTMENT (OUTPATIENT)
Dept: MRI IMAGING | Facility: CLINIC | Age: 42
End: 2022-11-23

## 2022-11-23 ENCOUNTER — RESULT REVIEW (OUTPATIENT)
Age: 42
End: 2022-11-23

## 2022-11-23 ENCOUNTER — OUTPATIENT (OUTPATIENT)
Dept: OUTPATIENT SERVICES | Facility: HOSPITAL | Age: 42
LOS: 1 days | End: 2022-11-23
Payer: COMMERCIAL

## 2022-11-23 DIAGNOSIS — Z00.8 ENCOUNTER FOR OTHER GENERAL EXAMINATION: ICD-10-CM

## 2022-11-23 DIAGNOSIS — R22.2 LOCALIZED SWELLING, MASS AND LUMP, TRUNK: ICD-10-CM

## 2022-11-23 PROCEDURE — 72146 MRI CHEST SPINE W/O DYE: CPT | Mod: 26

## 2022-11-23 PROCEDURE — 72146 MRI CHEST SPINE W/O DYE: CPT

## 2022-11-23 PROCEDURE — 72141 MRI NECK SPINE W/O DYE: CPT

## 2022-11-23 PROCEDURE — 72141 MRI NECK SPINE W/O DYE: CPT | Mod: 26

## 2022-11-28 ENCOUNTER — APPOINTMENT (OUTPATIENT)
Dept: ENDOCRINOLOGY | Facility: CLINIC | Age: 42
End: 2022-11-28

## 2022-12-23 ENCOUNTER — NON-APPOINTMENT (OUTPATIENT)
Age: 42
End: 2022-12-23

## 2022-12-23 DIAGNOSIS — R92.2 INCONCLUSIVE MAMMOGRAM: ICD-10-CM

## 2022-12-23 DIAGNOSIS — Z98.890 OTHER SPECIFIED POSTPROCEDURAL STATES: ICD-10-CM

## 2022-12-23 DIAGNOSIS — Z92.89 PERSONAL HISTORY OF OTHER MEDICAL TREATMENT: ICD-10-CM

## 2022-12-23 DIAGNOSIS — Z78.9 OTHER SPECIFIED HEALTH STATUS: ICD-10-CM

## 2023-01-10 NOTE — ASU PATIENT PROFILE, ADULT - SURGICAL SITE INCISION
[de-identified] : The patient is an established patient presenting with complaints of right knee pain over the last week.  He states he was in [place] and saw a local orthopedist there who drained" small Baker cyst" the patient states persistent pain.  He is now approximately 4 weeks status post Visco supplementation.  He states his left knee overall is doing very well no

## 2023-02-20 NOTE — ASU DISCHARGE PLAN (ADULT/PEDIATRIC) - CALL YOUR DOCTOR IF YOU HAVE ANY OF THE FOLLOWING:
Bleeding that does not stop/Pain not relieved by Medications/Fever greater than (need to indicate Fahrenheit or Celsius)/Wound/Surgical Site with redness, or foul smelling discharge or pus/Nausea and vomiting that does not stop/Unable to urinate/Increased irritability or sluggishness O-T Advancement Flap Text: The defect edges were debeveled with a #15 scalpel blade.  Given the location of the defect, shape of the defect and the proximity to free margins an O-T advancement flap was deemed most appropriate.  Using a sterile surgical marker, an appropriate advancement flap was drawn incorporating the defect and placing the expected incisions within the relaxed skin tension lines where possible.    The area thus outlined was incised deep to adipose tissue with a #15 scalpel blade.  The skin margins were undermined to an appropriate distance in all directions utilizing iris scissors.

## 2023-02-28 ENCOUNTER — APPOINTMENT (OUTPATIENT)
Dept: ENDOCRINOLOGY | Facility: CLINIC | Age: 43
End: 2023-02-28

## 2023-05-30 ENCOUNTER — APPOINTMENT (OUTPATIENT)
Dept: ENDOCRINOLOGY | Facility: CLINIC | Age: 43
End: 2023-05-30

## 2023-07-07 ENCOUNTER — NON-APPOINTMENT (OUTPATIENT)
Age: 43
End: 2023-07-07

## 2023-07-10 ENCOUNTER — NON-APPOINTMENT (OUTPATIENT)
Age: 43
End: 2023-07-10

## 2023-07-18 NOTE — H&P PST ADULT - ENDOCRINE COMMENTS
No was seeing endo for elevated prolactin levels -  last visit 5/2022 - note on chart; was evaluated due HA and mild memory issue- no change on worsening in symptoms

## 2023-08-02 ENCOUNTER — NON-APPOINTMENT (OUTPATIENT)
Age: 43
End: 2023-08-02

## 2023-08-24 ENCOUNTER — APPOINTMENT (OUTPATIENT)
Dept: ENDOCRINOLOGY | Facility: CLINIC | Age: 43
End: 2023-08-24

## 2023-12-29 ENCOUNTER — EMERGENCY (EMERGENCY)
Facility: HOSPITAL | Age: 43
LOS: 1 days | Discharge: HOME | End: 2023-12-29
Attending: EMERGENCY MEDICINE | Admitting: EMERGENCY MEDICINE
Payer: SELF-PAY

## 2023-12-29 VITALS
HEART RATE: 103 BPM | HEIGHT: 65 IN | DIASTOLIC BLOOD PRESSURE: 73 MMHG | WEIGHT: 149.91 LBS | RESPIRATION RATE: 20 BRPM | SYSTOLIC BLOOD PRESSURE: 105 MMHG | TEMPERATURE: 97 F | OXYGEN SATURATION: 100 %

## 2023-12-29 VITALS
HEART RATE: 85 BPM | TEMPERATURE: 98 F | OXYGEN SATURATION: 97 % | SYSTOLIC BLOOD PRESSURE: 114 MMHG | DIASTOLIC BLOOD PRESSURE: 74 MMHG | RESPIRATION RATE: 18 BRPM

## 2023-12-29 DIAGNOSIS — Z98.890 OTHER SPECIFIED POSTPROCEDURAL STATES: Chronic | ICD-10-CM

## 2023-12-29 DIAGNOSIS — Z90.49 ACQUIRED ABSENCE OF OTHER SPECIFIED PARTS OF DIGESTIVE TRACT: Chronic | ICD-10-CM

## 2023-12-29 LAB
ALBUMIN SERPL ELPH-MCNC: 3.2 G/DL — LOW (ref 3.3–5)
ALBUMIN SERPL ELPH-MCNC: 3.2 G/DL — LOW (ref 3.3–5)
ALP SERPL-CCNC: 50 U/L — SIGNIFICANT CHANGE UP (ref 40–120)
ALP SERPL-CCNC: 50 U/L — SIGNIFICANT CHANGE UP (ref 40–120)
ALT FLD-CCNC: 17 U/L — SIGNIFICANT CHANGE UP (ref 12–78)
ALT FLD-CCNC: 17 U/L — SIGNIFICANT CHANGE UP (ref 12–78)
ANION GAP SERPL CALC-SCNC: 3 MMOL/L — LOW (ref 5–17)
ANION GAP SERPL CALC-SCNC: 3 MMOL/L — LOW (ref 5–17)
APPEARANCE UR: CLEAR — SIGNIFICANT CHANGE UP
APPEARANCE UR: CLEAR — SIGNIFICANT CHANGE UP
AST SERPL-CCNC: 14 U/L — LOW (ref 15–37)
AST SERPL-CCNC: 14 U/L — LOW (ref 15–37)
BACTERIA # UR AUTO: ABNORMAL /HPF
BACTERIA # UR AUTO: ABNORMAL /HPF
BASOPHILS # BLD AUTO: 0 K/UL — SIGNIFICANT CHANGE UP (ref 0–0.2)
BASOPHILS # BLD AUTO: 0 K/UL — SIGNIFICANT CHANGE UP (ref 0–0.2)
BASOPHILS NFR BLD AUTO: 0 % — SIGNIFICANT CHANGE UP (ref 0–2)
BASOPHILS NFR BLD AUTO: 0 % — SIGNIFICANT CHANGE UP (ref 0–2)
BILIRUB SERPL-MCNC: 0.5 MG/DL — SIGNIFICANT CHANGE UP (ref 0.2–1.2)
BILIRUB SERPL-MCNC: 0.5 MG/DL — SIGNIFICANT CHANGE UP (ref 0.2–1.2)
BILIRUB UR-MCNC: NEGATIVE — SIGNIFICANT CHANGE UP
BILIRUB UR-MCNC: NEGATIVE — SIGNIFICANT CHANGE UP
BUN SERPL-MCNC: 8 MG/DL — SIGNIFICANT CHANGE UP (ref 7–23)
BUN SERPL-MCNC: 8 MG/DL — SIGNIFICANT CHANGE UP (ref 7–23)
CALCIUM SERPL-MCNC: 7.8 MG/DL — LOW (ref 8.5–10.1)
CALCIUM SERPL-MCNC: 7.8 MG/DL — LOW (ref 8.5–10.1)
CHLORIDE SERPL-SCNC: 110 MMOL/L — HIGH (ref 96–108)
CHLORIDE SERPL-SCNC: 110 MMOL/L — HIGH (ref 96–108)
CO2 SERPL-SCNC: 25 MMOL/L — SIGNIFICANT CHANGE UP (ref 22–31)
CO2 SERPL-SCNC: 25 MMOL/L — SIGNIFICANT CHANGE UP (ref 22–31)
COLOR SPEC: YELLOW — SIGNIFICANT CHANGE UP
COLOR SPEC: YELLOW — SIGNIFICANT CHANGE UP
CREAT SERPL-MCNC: 0.74 MG/DL — SIGNIFICANT CHANGE UP (ref 0.5–1.3)
CREAT SERPL-MCNC: 0.74 MG/DL — SIGNIFICANT CHANGE UP (ref 0.5–1.3)
DIFF PNL FLD: ABNORMAL
DIFF PNL FLD: ABNORMAL
EGFR: 102 ML/MIN/1.73M2 — SIGNIFICANT CHANGE UP
EGFR: 102 ML/MIN/1.73M2 — SIGNIFICANT CHANGE UP
EOSINOPHIL # BLD AUTO: 0 K/UL — SIGNIFICANT CHANGE UP (ref 0–0.5)
EOSINOPHIL # BLD AUTO: 0 K/UL — SIGNIFICANT CHANGE UP (ref 0–0.5)
EOSINOPHIL NFR BLD AUTO: 0 % — SIGNIFICANT CHANGE UP (ref 0–6)
EOSINOPHIL NFR BLD AUTO: 0 % — SIGNIFICANT CHANGE UP (ref 0–6)
EPI CELLS # UR: PRESENT
EPI CELLS # UR: PRESENT
GLUCOSE SERPL-MCNC: 110 MG/DL — HIGH (ref 70–99)
GLUCOSE SERPL-MCNC: 110 MG/DL — HIGH (ref 70–99)
GLUCOSE UR QL: NEGATIVE MG/DL — SIGNIFICANT CHANGE UP
GLUCOSE UR QL: NEGATIVE MG/DL — SIGNIFICANT CHANGE UP
HCG SERPL-ACNC: <1 MIU/ML — SIGNIFICANT CHANGE UP
HCG SERPL-ACNC: <1 MIU/ML — SIGNIFICANT CHANGE UP
HCT VFR BLD CALC: 39.7 % — SIGNIFICANT CHANGE UP (ref 34.5–45)
HCT VFR BLD CALC: 39.7 % — SIGNIFICANT CHANGE UP (ref 34.5–45)
HGB BLD-MCNC: 12.9 G/DL — SIGNIFICANT CHANGE UP (ref 11.5–15.5)
HGB BLD-MCNC: 12.9 G/DL — SIGNIFICANT CHANGE UP (ref 11.5–15.5)
KETONES UR-MCNC: NEGATIVE MG/DL — SIGNIFICANT CHANGE UP
KETONES UR-MCNC: NEGATIVE MG/DL — SIGNIFICANT CHANGE UP
LEUKOCYTE ESTERASE UR-ACNC: NEGATIVE — SIGNIFICANT CHANGE UP
LEUKOCYTE ESTERASE UR-ACNC: NEGATIVE — SIGNIFICANT CHANGE UP
LIDOCAIN IGE QN: 19 U/L — SIGNIFICANT CHANGE UP (ref 13–75)
LIDOCAIN IGE QN: 19 U/L — SIGNIFICANT CHANGE UP (ref 13–75)
LYMPHOCYTES # BLD AUTO: 0.33 K/UL — LOW (ref 1–3.3)
LYMPHOCYTES # BLD AUTO: 0.33 K/UL — LOW (ref 1–3.3)
LYMPHOCYTES # BLD AUTO: 5 % — LOW (ref 13–44)
LYMPHOCYTES # BLD AUTO: 5 % — LOW (ref 13–44)
MANUAL SMEAR VERIFICATION: SIGNIFICANT CHANGE UP
MANUAL SMEAR VERIFICATION: SIGNIFICANT CHANGE UP
MCHC RBC-ENTMCNC: 28.2 PG — SIGNIFICANT CHANGE UP (ref 27–34)
MCHC RBC-ENTMCNC: 28.2 PG — SIGNIFICANT CHANGE UP (ref 27–34)
MCHC RBC-ENTMCNC: 32.5 GM/DL — SIGNIFICANT CHANGE UP (ref 32–36)
MCHC RBC-ENTMCNC: 32.5 GM/DL — SIGNIFICANT CHANGE UP (ref 32–36)
MCV RBC AUTO: 86.7 FL — SIGNIFICANT CHANGE UP (ref 80–100)
MCV RBC AUTO: 86.7 FL — SIGNIFICANT CHANGE UP (ref 80–100)
MONOCYTES # BLD AUTO: 0.13 K/UL — SIGNIFICANT CHANGE UP (ref 0–0.9)
MONOCYTES # BLD AUTO: 0.13 K/UL — SIGNIFICANT CHANGE UP (ref 0–0.9)
MONOCYTES NFR BLD AUTO: 2 % — SIGNIFICANT CHANGE UP (ref 2–14)
MONOCYTES NFR BLD AUTO: 2 % — SIGNIFICANT CHANGE UP (ref 2–14)
NEUTROPHILS # BLD AUTO: 6.07 K/UL — SIGNIFICANT CHANGE UP (ref 1.8–7.4)
NEUTROPHILS # BLD AUTO: 6.07 K/UL — SIGNIFICANT CHANGE UP (ref 1.8–7.4)
NEUTROPHILS NFR BLD AUTO: 93 % — HIGH (ref 43–77)
NEUTROPHILS NFR BLD AUTO: 93 % — HIGH (ref 43–77)
NITRITE UR-MCNC: NEGATIVE — SIGNIFICANT CHANGE UP
NITRITE UR-MCNC: NEGATIVE — SIGNIFICANT CHANGE UP
NRBC # BLD: 0 /100 WBCS — SIGNIFICANT CHANGE UP (ref 0–0)
NRBC # BLD: 0 /100 WBCS — SIGNIFICANT CHANGE UP (ref 0–0)
NRBC # BLD: SIGNIFICANT CHANGE UP /100 WBCS (ref 0–0)
NRBC # BLD: SIGNIFICANT CHANGE UP /100 WBCS (ref 0–0)
PH UR: 6 — SIGNIFICANT CHANGE UP (ref 5–8)
PH UR: 6 — SIGNIFICANT CHANGE UP (ref 5–8)
PLAT MORPH BLD: NORMAL — SIGNIFICANT CHANGE UP
PLAT MORPH BLD: NORMAL — SIGNIFICANT CHANGE UP
PLATELET # BLD AUTO: 330 K/UL — SIGNIFICANT CHANGE UP (ref 150–400)
PLATELET # BLD AUTO: 330 K/UL — SIGNIFICANT CHANGE UP (ref 150–400)
POTASSIUM SERPL-MCNC: 4.1 MMOL/L — SIGNIFICANT CHANGE UP (ref 3.5–5.3)
POTASSIUM SERPL-MCNC: 4.1 MMOL/L — SIGNIFICANT CHANGE UP (ref 3.5–5.3)
POTASSIUM SERPL-SCNC: 4.1 MMOL/L — SIGNIFICANT CHANGE UP (ref 3.5–5.3)
POTASSIUM SERPL-SCNC: 4.1 MMOL/L — SIGNIFICANT CHANGE UP (ref 3.5–5.3)
PROT SERPL-MCNC: 6.9 G/DL — SIGNIFICANT CHANGE UP (ref 6–8.3)
PROT SERPL-MCNC: 6.9 G/DL — SIGNIFICANT CHANGE UP (ref 6–8.3)
PROT UR-MCNC: SIGNIFICANT CHANGE UP MG/DL
PROT UR-MCNC: SIGNIFICANT CHANGE UP MG/DL
RBC # BLD: 4.58 M/UL — SIGNIFICANT CHANGE UP (ref 3.8–5.2)
RBC # BLD: 4.58 M/UL — SIGNIFICANT CHANGE UP (ref 3.8–5.2)
RBC # FLD: 14.6 % — HIGH (ref 10.3–14.5)
RBC # FLD: 14.6 % — HIGH (ref 10.3–14.5)
RBC BLD AUTO: NORMAL — SIGNIFICANT CHANGE UP
RBC BLD AUTO: NORMAL — SIGNIFICANT CHANGE UP
RBC CASTS # UR COMP ASSIST: 0 /HPF — SIGNIFICANT CHANGE UP (ref 0–4)
RBC CASTS # UR COMP ASSIST: 0 /HPF — SIGNIFICANT CHANGE UP (ref 0–4)
SODIUM SERPL-SCNC: 138 MMOL/L — SIGNIFICANT CHANGE UP (ref 135–145)
SODIUM SERPL-SCNC: 138 MMOL/L — SIGNIFICANT CHANGE UP (ref 135–145)
SP GR SPEC: 1.1 — HIGH (ref 1–1.03)
SP GR SPEC: 1.1 — HIGH (ref 1–1.03)
UROBILINOGEN FLD QL: 1 MG/DL — SIGNIFICANT CHANGE UP (ref 0.2–1)
UROBILINOGEN FLD QL: 1 MG/DL — SIGNIFICANT CHANGE UP (ref 0.2–1)
WBC # BLD: 6.53 K/UL — SIGNIFICANT CHANGE UP (ref 3.8–10.5)
WBC # BLD: 6.53 K/UL — SIGNIFICANT CHANGE UP (ref 3.8–10.5)
WBC # FLD AUTO: 6.53 K/UL — SIGNIFICANT CHANGE UP (ref 3.8–10.5)
WBC # FLD AUTO: 6.53 K/UL — SIGNIFICANT CHANGE UP (ref 3.8–10.5)
WBC UR QL: 2 /HPF — SIGNIFICANT CHANGE UP (ref 0–5)
WBC UR QL: 2 /HPF — SIGNIFICANT CHANGE UP (ref 0–5)

## 2023-12-29 PROCEDURE — 36415 COLL VENOUS BLD VENIPUNCTURE: CPT

## 2023-12-29 PROCEDURE — 74177 CT ABD & PELVIS W/CONTRAST: CPT | Mod: MA

## 2023-12-29 PROCEDURE — 74177 CT ABD & PELVIS W/CONTRAST: CPT | Mod: 26,MA

## 2023-12-29 PROCEDURE — 96365 THER/PROPH/DIAG IV INF INIT: CPT | Mod: XU

## 2023-12-29 PROCEDURE — 99285 EMERGENCY DEPT VISIT HI MDM: CPT

## 2023-12-29 PROCEDURE — 96375 TX/PRO/DX INJ NEW DRUG ADDON: CPT

## 2023-12-29 PROCEDURE — 81001 URINALYSIS AUTO W/SCOPE: CPT

## 2023-12-29 PROCEDURE — 99284 EMERGENCY DEPT VISIT MOD MDM: CPT | Mod: 25

## 2023-12-29 PROCEDURE — 83690 ASSAY OF LIPASE: CPT

## 2023-12-29 PROCEDURE — 80053 COMPREHEN METABOLIC PANEL: CPT

## 2023-12-29 PROCEDURE — 96361 HYDRATE IV INFUSION ADD-ON: CPT

## 2023-12-29 PROCEDURE — 84702 CHORIONIC GONADOTROPIN TEST: CPT

## 2023-12-29 PROCEDURE — 85025 COMPLETE CBC W/AUTO DIFF WBC: CPT

## 2023-12-29 PROCEDURE — 87086 URINE CULTURE/COLONY COUNT: CPT

## 2023-12-29 RX ORDER — FAMOTIDINE 10 MG/ML
20 INJECTION INTRAVENOUS ONCE
Refills: 0 | Status: COMPLETED | OUTPATIENT
Start: 2023-12-29 | End: 2023-12-29

## 2023-12-29 RX ORDER — SODIUM CHLORIDE 9 MG/ML
1000 INJECTION INTRAMUSCULAR; INTRAVENOUS; SUBCUTANEOUS ONCE
Refills: 0 | Status: COMPLETED | OUTPATIENT
Start: 2023-12-29 | End: 2023-12-29

## 2023-12-29 RX ORDER — ACETAMINOPHEN 500 MG
1000 TABLET ORAL ONCE
Refills: 0 | Status: COMPLETED | OUTPATIENT
Start: 2023-12-29 | End: 2023-12-29

## 2023-12-29 RX ORDER — CALCIUM GLUCONATE 100 MG/ML
2 VIAL (ML) INTRAVENOUS ONCE
Refills: 0 | Status: COMPLETED | OUTPATIENT
Start: 2023-12-29 | End: 2023-12-29

## 2023-12-29 RX ORDER — IOHEXOL 300 MG/ML
30 INJECTION, SOLUTION INTRAVENOUS ONCE
Refills: 0 | Status: DISCONTINUED | OUTPATIENT
Start: 2023-12-29 | End: 2023-12-29

## 2023-12-29 RX ORDER — ONDANSETRON 8 MG/1
4 TABLET, FILM COATED ORAL ONCE
Refills: 0 | Status: COMPLETED | OUTPATIENT
Start: 2023-12-29 | End: 2023-12-29

## 2023-12-29 RX ADMIN — Medication 2 GRAM(S): at 21:28

## 2023-12-29 RX ADMIN — ONDANSETRON 4 MILLIGRAM(S): 8 TABLET, FILM COATED ORAL at 19:45

## 2023-12-29 RX ADMIN — Medication 1000 MILLIGRAM(S): at 21:20

## 2023-12-29 RX ADMIN — Medication 1000 MILLIGRAM(S): at 20:00

## 2023-12-29 RX ADMIN — Medication 400 MILLIGRAM(S): at 19:45

## 2023-12-29 RX ADMIN — FAMOTIDINE 20 MILLIGRAM(S): 10 INJECTION INTRAVENOUS at 19:45

## 2023-12-29 RX ADMIN — SODIUM CHLORIDE 1000 MILLILITER(S): 9 INJECTION INTRAMUSCULAR; INTRAVENOUS; SUBCUTANEOUS at 21:28

## 2023-12-29 RX ADMIN — Medication 200 GRAM(S): at 20:42

## 2023-12-29 RX ADMIN — SODIUM CHLORIDE 1000 MILLILITER(S): 9 INJECTION INTRAMUSCULAR; INTRAVENOUS; SUBCUTANEOUS at 19:46

## 2023-12-29 NOTE — ED ADULT TRIAGE NOTE - CHIEF COMPLAINT QUOTE
I started to feel slightly nauseas during the day.  during the night, I had my collagen shake (typical daily event) and then the pain started (upper right quadrant region).  I have been hospitalized in the past for obstructions before.  I had a bm about one hour ago.  I have been vomiting.  I have never had surgery, nor have I ever needed an NG tube. (GI is: I can't recall the name.  has not seen since one year)

## 2023-12-29 NOTE — ED PROVIDER NOTE - DATE/TIME 1
Subjective   Sohail Curiel is a 37 y.o. male seen as a new patient for abnormal enzyme levels. He states that he is having fatigue and low energy level. Wife states that he is falling asleep all of the time. She also states that he is typically always on the move but his energy has been way down.     History of Present Illness this is a 37-year-old gentleman who is here for consultation and evaluating his abnormal liver enzymes a specifically alkaline phosphatase.  He said his symptoms began about 3 to 4 months ago when he started feeling more and more tired and despite the fact that he had a fall night of his sleep he wanted to go to sleep during the day and sometimes even behind the wheel he feels a sleepy and when he takes his children to park instead of being able to watch them he just goes to sleep on the park bench.  He denies any history of trauma to his head or any other parts of his body.  He is very active in his sports and is an avid runner.  He has been  for the past 7 years and has 3 children.  He denies any kidney stone or change of senses including visual or auditory and sense of hearing in his smell and tasting.  His family history is negative for any chronic illness including thyroid disease or diabetes or high lipids or cholesterol or heart and lung or brain disease.  He denies the use of tobacco or alcohol or any other recreational drugs.  He has been on Voltaren 75 mg twice daily for the past couple of weeks for his hip pain.  All these points also were verified by his wife who was present during this office visit.  On further questioning his wife admits to the fact that he does snore during his asleep.    No Known Allergies    Current Outpatient Medications:   •  diclofenac (VOLTAREN) 75 MG EC tablet, Take 75 mg by mouth 2 (Two) Times a Day., Disp: , Rfl:       The following portions of the patient's history were reviewed and updated as appropriate: allergies, current medications,  past family history, past medical history, past social history, past surgical history and problem list.    Review of Systems   Constitutional: Positive for activity change and fatigue.   HENT: Negative.    Eyes: Negative.    Respiratory: Negative.    Cardiovascular: Negative.    Gastrointestinal: Negative.    Endocrine: Negative.    Genitourinary: Negative.    Musculoskeletal: Negative.    Skin: Negative.    Allergic/Immunologic: Negative.    Neurological: Negative.    Hematological: Negative.    Psychiatric/Behavioral: Negative.        Objective   Physical Exam   Constitutional: He is oriented to person, place, and time. He appears well-developed and well-nourished. No distress.   HENT:   Head: Normocephalic and atraumatic.   Right Ear: External ear normal.   Left Ear: External ear normal.   Nose: Nose normal.   Mouth/Throat: Oropharynx is clear and moist. No oropharyngeal exudate.   Eyes: Conjunctivae and EOM are normal. Pupils are equal, round, and reactive to light. Right eye exhibits no discharge. Left eye exhibits no discharge. No scleral icterus.   Neck: Normal range of motion. Neck supple. No JVD present. No tracheal deviation present. No thyromegaly present.   Cardiovascular: Normal rate, regular rhythm, normal heart sounds and intact distal pulses. Exam reveals no gallop and no friction rub.   No murmur heard.  Pulmonary/Chest: Effort normal and breath sounds normal. No stridor. No respiratory distress. He has no wheezes. He has no rales. He exhibits no tenderness.   Abdominal: Soft. Bowel sounds are normal. He exhibits no distension and no mass. There is no tenderness. There is no rebound and no guarding. No hernia.   Musculoskeletal: Normal range of motion. He exhibits no edema, tenderness or deformity.   Lymphadenopathy:     He has no cervical adenopathy.   Neurological: He is alert and oriented to person, place, and time. He displays normal reflexes. No cranial nerve deficit or sensory deficit. He  exhibits normal muscle tone. Coordination normal.   Skin: Skin is warm and dry. No rash noted. He is not diaphoretic. No erythema. No pallor.   Psychiatric: He has a normal mood and affect. His behavior is normal. Judgment and thought content normal.   Nursing note and vitals reviewed.        Assessment/Plan   Diagnoses and all orders for this visit:    Chronic fatigue  -     T3, Free  -     T4 & TSH ([facility])  -     T4, Free  -     TestT+TestF+SHBG  -     Uric Acid  -     Vitamin D 25 Hydroxy  -     Comprehensive Metabolic Panel  -     C-Peptide  -     Follicle Stimulating Hormone  -     Hemoglobin A1c  -     Lipid Panel  -     Luteinizing Hormone  -     Prolactin  -     PSA DIAGNOSTIC  -     ACTH  -     Cortisol  -     Catecholamines, Fractionated, Plasma  -     Phosphorus  -     PTH, Intact  -     Calcium, Ionized  -     Growth Hormone  -     Insulin-like Growth Factor  -     Alkaline Phosphatase, Bone Specific  -     Albumin  -     Alkaline Phosphatase  -     ALT  -     AST  -     Bilirubin, Total  -     Lactate Dehydrogenase  -     Protein, Total  -     Gamma GT  -     CBC & Differential    Abnormal liver enzymes  -     T3, Free  -     T4 & TSH ([facility])  -     T4, Free  -     TestT+TestF+SHBG  -     Uric Acid  -     Vitamin D 25 Hydroxy  -     Comprehensive Metabolic Panel  -     C-Peptide  -     Follicle Stimulating Hormone  -     Hemoglobin A1c  -     Lipid Panel  -     Luteinizing Hormone  -     Prolactin  -     PSA DIAGNOSTIC  -     ACTH  -     Cortisol  -     Catecholamines, Fractionated, Plasma  -     Phosphorus  -     PTH, Intact  -     Calcium, Ionized  -     Growth Hormone  -     Insulin-like Growth Factor  -     Alkaline Phosphatase, Bone Specific  -     Albumin  -     Alkaline Phosphatase  -     ALT  -     AST  -     Bilirubin, Total  -     Lactate Dehydrogenase  -     Protein, Total  -     Gamma GT  -     CBC & Differential    Blood alkaline phosphatase increased compared with prior measurement  -      T3, Free  -     T4 & TSH ([facility])  -     T4, Free  -     TestT+TestF+SHBG  -     Uric Acid  -     Vitamin D 25 Hydroxy  -     Comprehensive Metabolic Panel  -     C-Peptide  -     Follicle Stimulating Hormone  -     Hemoglobin A1c  -     Lipid Panel  -     Luteinizing Hormone  -     Prolactin  -     PSA DIAGNOSTIC  -     ACTH  -     Cortisol  -     Catecholamines, Fractionated, Plasma  -     Phosphorus  -     PTH, Intact  -     Calcium, Ionized  -     Growth Hormone  -     Insulin-like Growth Factor  -     Alkaline Phosphatase, Bone Specific  -     Albumin  -     Alkaline Phosphatase  -     ALT  -     AST  -     Bilirubin, Total  -     Lactate Dehydrogenase  -     Protein, Total  -     Gamma GT  -     CBC & Differential    Muscle weakness  -     T3, Free  -     T4 & TSH ([facility])  -     T4, Free  -     TestT+TestF+SHBG  -     Uric Acid  -     Vitamin D 25 Hydroxy  -     Comprehensive Metabolic Panel  -     C-Peptide  -     Follicle Stimulating Hormone  -     Hemoglobin A1c  -     Lipid Panel  -     Luteinizing Hormone  -     Prolactin  -     PSA DIAGNOSTIC  -     ACTH  -     Cortisol  -     Catecholamines, Fractionated, Plasma  -     Phosphorus  -     PTH, Intact  -     Calcium, Ionized  -     Growth Hormone  -     Insulin-like Growth Factor  -     Alkaline Phosphatase, Bone Specific  -     Albumin  -     Alkaline Phosphatase  -     ALT  -     AST  -     Bilirubin, Total  -     Lactate Dehydrogenase  -     Protein, Total  -     Gamma GT  -     CBC & Differential    Somnolence  -     T3, Free  -     T4 & TSH ([facility])  -     T4, Free  -     TestT+TestF+SHBG  -     Uric Acid  -     Vitamin D 25 Hydroxy  -     Comprehensive Metabolic Panel  -     C-Peptide  -     Follicle Stimulating Hormone  -     Hemoglobin A1c  -     Lipid Panel  -     Luteinizing Hormone  -     Prolactin  -     PSA DIAGNOSTIC  -     ACTH  -     Cortisol  -     Catecholamines, Fractionated, Plasma  -     Phosphorus  -     PTH, Intact  -      Calcium, Ionized  -     Growth Hormone  -     Insulin-like Growth Factor  -     Alkaline Phosphatase, Bone Specific  -     Albumin  -     Alkaline Phosphatase  -     ALT  -     AST  -     Bilirubin, Total  -     Lactate Dehydrogenase  -     Protein, Total  -     Gamma GT  -     CBC & Differential    Obstructive sleep apnea  -     Ambulatory Referral to Sleep Medicine      In summary I saw and examined this 37-year-old gentleman for above-mentioned problems.  I reviewed his laboratory evaluations and office notes from his primary care provider's office and [facility] at [place] and at this point we will go ahead and order extensive laboratory evaluation and once the results come back we will go ahead and order additional evaluation and or modification or new medications.  He will see MsBreanna Georgie Suttonbrandyn in 4 months or sooner if needed with laboratory evaluation prior to each office visit.            29-Dec-2023 21:18

## 2023-12-29 NOTE — ED PROVIDER NOTE - CARE PROVIDER_API CALL
Ajay Pina  Gastroenterology  [address] [place] [address]  Phone: [phone]  Fax: [phone]  Follow Up Time:    Ajay Pina  Gastroenterology  [address] [place] [address]  Phone: [phone]  Fax: [phone]  Follow Up Time:

## 2023-12-29 NOTE — ED ADULT TRIAGE NOTE - PAIN RATING/NUMBER SCALE (0-10): ACTIVITY
29-year-old pleasant male with past medical history significant for but not limited to GERD, hypertension, here with complaint of constipation, lower abdominal pain, and rectal bleeding.  Patient reports that he has struggled with constipation lately and his stools are hard.  On one occasion he noticed some blood on his toilet paper.  Denies having any frequent rectal bleeding.  Also complains of lower abdominal pain, dull in nature, 2 out of 10 in intensity, nonradiating, no aggravating or relieving factors identified by the patient.  Patient also complains of having frequent urination recently.  Primary care physician obtain urine culture and analysis as per patient that was noted normal.  Patient has been referred to a urologist at for evaluation of frequent micturition. ROS otherwise unremarkable.  Patient denies having any nausea, vomiting, unintentional weight loss, lack of appetite. No previous colonoscopy on file. Of note, patient also reports that he was recently seen at the ?ED/urgent care due to frequent micturation and abdominal pain and underwent CT scan, records not available at this time.
7 (severe pain)

## 2023-12-29 NOTE — ED ADULT NURSE NOTE - OBJECTIVE STATEMENT
Pt. received alert and oriented x3 with chief complaint of generalized abdominal pain w/ nausea starting today.

## 2023-12-29 NOTE — ED PROVIDER NOTE - MDM PATIENT STATEMENT FOR ADDL TREATMENT
EMERGENCY DEPARTMENT ENCOUNTER    CHIEF COMPLAINT   Chief Complaint: fall  History given by: pt  History limited by: none  Room Number: 13/13  PMD: Edgard Recinos MD      HPI:  Pt is a 77 y.o. female who presents complaining of left hip pain s/p mechanical trip and fall earlier today. She slipped while cleaning out her dog's pen, and fell on gravel with impact to her left hip. She was unable to bear weight or ambulate after the fall. Pain is exacerbated by movement. She denies CP, SOA, LOC and other complaints at this time.     Duration:  Several hours   Onset: gradual   Timing: constant   Location: L hip   Radiation: none  Quality: mechanical fall   Intensity/Severity: moderate   Progression: unchanged   Associated Symptoms: none  Aggravating Factors: movement   Alleviating Factors: rest   Previous Episodes: none reported   Treatment before arrival: none reported     PAST MEDICAL HISTORY  Active Ambulatory Problems     Diagnosis Date Noted   • No Active Ambulatory Problems     Resolved Ambulatory Problems     Diagnosis Date Noted   • No Resolved Ambulatory Problems     No Additional Past Medical History       PAST SURGICAL HISTORY  No past surgical history on file.    FAMILY HISTORY  No family history on file.    SOCIAL HISTORY  Social History     Social History   • Marital status:      Spouse name: N/A   • Number of children: N/A   • Years of education: N/A     Occupational History   • Not on file.     Social History Main Topics   • Smoking status: Not on file   • Smokeless tobacco: Not on file   • Alcohol use Not on file   • Drug use: Not on file   • Sexual activity: Not on file     Other Topics Concern   • Not on file     Social History Narrative       ALLERGIES  Review of patient's allergies indicates no known allergies.    REVIEW OF SYSTEMS  Review of Systems   Constitutional: Negative for fever.   HENT: Negative for sore throat.    Eyes: Negative.    Respiratory: Negative for cough and shortness of  breath.    Cardiovascular: Negative for chest pain.   Gastrointestinal: Negative for abdominal pain, diarrhea and vomiting.   Genitourinary: Negative for dysuria.   Musculoskeletal: Positive for arthralgias (L Hip pain). Negative for neck pain.   Skin: Negative for rash.   Allergic/Immunologic: Negative.    Neurological: Negative for weakness, numbness and headaches.   Hematological: Negative.    Psychiatric/Behavioral: Negative.    All other systems reviewed and are negative.      PHYSICAL EXAM  ED Triage Vitals   Temp Heart Rate Resp BP SpO2   06/16/17 2259 06/16/17 2257 06/16/17 2257 06/16/17 2257 06/16/17 2257   97.8 °F (36.6 °C) 80 16 124/78 98 %      Temp src Heart Rate Source Patient Position BP Location FiO2 (%)   06/16/17 2259 06/16/17 2257 -- -- --   Tympanic Monitor          Physical Exam   Constitutional: She is oriented to person, place, and time and well-developed, well-nourished, and in no distress. No distress.   HENT:   Head: Normocephalic and atraumatic.   Eyes: EOM are normal. Pupils are equal, round, and reactive to light.   Neck: Normal range of motion. Neck supple.   Cardiovascular: Normal rate, regular rhythm and normal heart sounds.    Pulmonary/Chest: Effort normal and breath sounds normal. No respiratory distress.   Abdominal: Soft. There is no tenderness. There is no rebound and no guarding.   Musculoskeletal:   Left leg is shortened. Tenderness and deformity at the left hip. Unable to bear weight. Neuro intact.    Neurological: She is alert and oriented to person, place, and time. She has normal sensation and normal strength.   Skin: Skin is warm and dry. No rash noted.   Psychiatric: Mood and affect normal.   Nursing note and vitals reviewed.      LAB RESULTS  Lab Results (last 24 hours)     Procedure Component Value Units Date/Time    CBC & Differential [[id number]] Collected:  06/17/17 0016    Specimen:  Blood Updated:  06/17/17 0047    Narrative:       The following orders were created  for panel order CBC & Differential.  Procedure                               Abnormality         Status                     ---------                               -----------         ------                     CBC Auto Differential[[id number]]        Abnormal            Final result                 Please view results for these tests on the individual orders.    Comprehensive Metabolic Panel [[id number]]  (Abnormal) Collected:  06/17/17 0016    Specimen:  Blood Updated:  06/17/17 0048     Glucose 106 (H) mg/dL      BUN 21 mg/dL      Creatinine 0.72 mg/dL      Sodium 136 mmol/L      Potassium 3.8 mmol/L      Chloride 100 mmol/L      CO2 18.9 (L) mmol/L      Calcium 8.9 mg/dL      Total Protein 7.3 g/dL      Albumin 3.90 g/dL      ALT (SGPT) 13 U/L      AST (SGOT) 18 U/L      Alkaline Phosphatase 51 U/L      Total Bilirubin 0.3 mg/dL      eGFR Non African Amer 78 mL/min/1.73      Globulin 3.4 gm/dL      A/G Ratio 1.1 g/dL      BUN/Creatinine Ratio 29.2 (H)     Anion Gap 17.1 mmol/L     Narrative:       The MDRD GFR formula is only valid for adults with stable renal function between ages 18 and 70.    Protime-INR [[id number]]  (Normal) Collected:  06/17/17 0016    Specimen:  Blood Updated:  06/17/17 0055     Protime 13.4 Seconds      INR 1.06    CBC Auto Differential [[id number]]  (Abnormal) Collected:  06/17/17 0016    Specimen:  Blood Updated:  06/17/17 0047     WBC 12.95 (H) 10*3/mm3      RBC 3.80 (L) 10*6/mm3      Hemoglobin 12.2 g/dL      Hematocrit 37.1 %      MCV 97.6 fL      MCH 32.1 (H) pg      MCHC 32.9 g/dL      RDW 14.6 (H) %      RDW-SD 51.9 fl      MPV 10.4 fL      Platelets 260 10*3/mm3      Neutrophil % 82.5 (H) %      Lymphocyte % 10.9 (L) %      Monocyte % 5.4 %      Eosinophil % 0.4 %      Basophil % 0.5 %      Immature Grans % 0.3 %      Neutrophils, Absolute 10.68 (H) 10*3/mm3      Lymphocytes, Absolute 1.41 10*3/mm3      Monocytes, Absolute 0.70 10*3/mm3      Eosinophils, Absolute 0.05 10*3/mm3       Basophils, Absolute 0.07 10*3/mm3      Immature Grans, Absolute 0.04 (H) 10*3/mm3           I ordered the above labs and reviewed the results    RADIOLOGY  XR Hip With or Without Pelvis 2 - 3 View Left   Preliminary Result   Intertrochanteric fracture of the left proximal femur.        ----------------------------------------------------------------       X-RAY CHEST 1 VIEW.       HISTORY: Hip fracture.       COMPARISON: No prior studies for comparison.       FINDINGS:   Cardiomediastinal silhouette is within normal limits.           There is no consolidation or effusion.               IMPRESSION:   No acute findings.              XR Chest 1 View   Preliminary Result   Intertrochanteric fracture of the left proximal femur.        ----------------------------------------------------------------       X-RAY CHEST 1 VIEW.       HISTORY: Hip fracture.       COMPARISON: No prior studies for comparison.       FINDINGS:   Cardiomediastinal silhouette is within normal limits.           There is no consolidation or effusion.               IMPRESSION:   No acute findings.                   I ordered the above noted radiological studies. Interpreted by radiologist. Reviewed by me in PACS.       PROCEDURES  Procedures      PROGRESS AND CONSULTS  ED Course     23:29 Ordered blood work, PT with INR, CXR and XR L Hip for furtther evaluation.      00:52 Placed call to [facility] for admission. She declines pain medication at this time.     Spoke with pt about XR which showed L hip fracture and plan to admit. Pt and family agree with course of care. Addressed all questions.     00:57 Discussed case with Dr. Verduzco ([facility]) who agrees to admit pt.         MEDICAL DECISION MAKING  Results were reviewed/discussed with the patient and they were also made aware of online access. Pt also made aware that some labs, such as cultures, will not be resulted during ER visit and follow up with PMD is necessary.     MDM  Number of Diagnoses or Management  Options  Closed left hip fracture, initial encounter:      Amount and/or Complexity of Data Reviewed  Clinical lab tests: reviewed and ordered (INR of 1.06   HGB of 12.2)  Tests in the radiology section of CPT®: ordered and reviewed (XR L Hip showed intertrochanteric fracture of the left proximal femur)  Decide to obtain previous medical records or to obtain history from someone other than the patient: yes  Review and summarize past medical records: yes  Discuss the patient with other providers: yes (D/w Dr. Verduzco ([facility]))  Independent visualization of images, tracings, or specimens: yes    Patient Progress  Patient progress: stable         DIAGNOSIS  Final diagnoses:   Closed left hip fracture, initial encounter       DISPOSITION  ADMISSION    Discussed treatment plan and reason for admission with pt/family and admitting physician.  Pt/family voiced understanding of the plan for admission for further testing/treatment as needed.         Latest Documented Vital Signs:  As of 1:01 AM  BP- 124/78 HR- 80 Temp- 97.8 °F (36.6 °C) (Tympanic) O2 sat- 98%    --  Documentation assistance provided by nakul Tsang for Dr. May.  Information recorded by the scrfayee was done at my direction and has been verified and validated by me.     Lo Tsang  06/17/17 0102       Dontrell May MD  06/17/17 0119     Patient with one or more new problems requiring additional work-up/treatment.

## 2023-12-29 NOTE — ED PROVIDER NOTE - OBJECTIVE STATEMENT
Patient is a 43-year-old female who has a history of reported intermittent bowel obstructions.  No significant surgeries for this.  She does follow with a GI doctor whose name she cannot recall that is affiliated with [facility].  Status post laparoscopic cholecystectomy 2 years ago.  Status post breast reduction surgery, status post foot surgery.  Is a social daily smoker.  No drug use or alcohol.  No drug allergies.  Starting 24 hours ago she had an episode of nausea with abdominal bloating.  This was associated with vomiting last night, diarrhea this morning.  She states symptoms began after she consumed her usual shake as a meal.  This morning she was able to keep down liquids.  But when she consumes liquids to cause burning pain in her abdomen.  With the persistence of the abdominal pain and bloating symptoms she came to the emergency room for evaluation.  She denies pregnancy.  She denies ill contacts.  She denies trauma or travel.  She denies domestic violence.  She denies rash.  She denies fevers or chills. Patient is a 43-year-old female who has a history of reported intermittent bowel obstructions.  No significant surgeries for this.  She does follow with a GI doctor whose name she cannot recall that is affiliated with [facility].  Status post laparoscopic cholecystectomy 2 years ago.  Status post breast reduction surgery, status post foot surgery.  Is a social daily smoker.  No drug use or alcohol.  No drug allergies.  Starting 24 hours ago she had an episode of nausea with abdominal bloating.  This was associated with vomiting last night, diarrhea this morning.  She states symptoms began after she consumed her usual shake as a meal.  This morning she was able to keep down liquids.  But when she consumes liquids to cause burning pain in her abdomen.  With the persistence of the abdominal pain and bloating symptoms she came to the emergency room for evaluation.  She denies pregnancy.  She denies ill contacts.  She denies trauma or travel.  She denies domestic violence.  She denies rash.  She denies fevers or chills.

## 2023-12-29 NOTE — ED PROVIDER NOTE - CLINICAL SUMMARY MEDICAL DECISION MAKING FREE TEXT BOX
Patient is a 43-year-old female who has a history of intermittent bowel obstruction followed by GI.  Status post laparoscopic cholecystectomy remotely.  Presents to the emergency room with epigastric abdominal burning nausea vomiting and loose stools for 24 hours.  Able to tolerate p.o. now, she has continued pain and bloating symptoms.  Plan of care includes rule out gastritis rule out retained common duct stone, rule out pancreatitis.  Rule out bowel obstruction.  Will obtain a CT with oral and IV contrast.  Will provide IV medications to relieve symptoms.  Obtain laboratory studies including a CBC electrolytes liver function tests lipase pregnancy test.  Disposition accordingly.  Dragon end of dictation this chart was made with dictation software and may contain typographical errors.

## 2023-12-29 NOTE — ED PROVIDER NOTE - GASTROINTESTINAL, MLM
Abdomen soft, mild tender to palp of epigastrum no guard no rebound no cvat no hyperactive bs
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 Patient states that he was in the bathroom, slipped and fell down. Patient immediately began having right shoulder, back, and hip pain. Patient denies any loss of consciousness before or after the fall. Patient states he has also been experiencing worsening leg swelling recently, and since he has been in the ER has been having worsening abdominal swelling. Patient was admitted for further management. Patient denies any fevers, chills, CP, syncope, N/V/D, headache, or dizziness.

## 2023-12-29 NOTE — ED PROVIDER NOTE - PATIENT PORTAL LINK FT
You can access the FollowMyHealth Patient Portal offered by [facility] by registering at the following website: [email or web address]. By joining [employer]’s FollowMyHealth portal, you will also be able to view your health information using other applications (apps) compatible with our system. You can access the FollowMyHealth Patient Portal offered by [facility] by registering at the following website: [email or web address]. By joining [employer]’s FollowMyHealth portal, you will also be able to view your health information using other applications (apps) compatible with our system.

## 2023-12-29 NOTE — ED PROVIDER NOTE - NSFOLLOWUPINSTRUCTIONS_ED_ALL_ED_FT
99 Increase daily calcium  Consider vitamin D supplementation with your calcium supplementation  Follow-up with GI specialist Dr. Pina or your primary GI specialist.  Clear liquid diet advance slowly as tolerated to  Bananas Rice Tea and Toast (with margarine or jam)  then advance to Baked and boiled (eggs, pasta, chicken)  once tolerated you may advance slowly to regular  Eat small volumes   NO fatty fried foods, no milk or mild products, no tomato, spice, mint, tobacco, alcohol, caffeine  Stay well hydrated: a teaspoon at a time until able to tolerate normal intake.  Prompt follow up with your doctor is essential  return for worsening symptoms or any problems/concerns

## 2023-12-29 NOTE — ED ADULT NURSE NOTE - NSFALLUNIVINTERV_ED_ALL_ED
Bed/Stretcher in lowest position, wheels locked, appropriate side rails in place/Call bell, personal items and telephone in reach/Instruct patient to call for assistance before getting out of bed/chair/stretcher/Non-slip footwear applied when patient is off stretcher/[place] to call system/Physically safe environment - no spills, clutter or unnecessary equipment/Purposeful proactive rounding/Room/bathroom lighting operational, light cord in reach Bed/Stretcher in lowest position, wheels locked, appropriate side rails in place/Call bell, personal items and telephone in reach/Instruct patient to call for assistance before getting out of bed/chair/stretcher/Non-slip footwear applied when patient is off stretcher/[place] to call system/Physically safe environment - no spills, clutter or unnecessary equipment/Purposeful proactive rounding/Room/bathroom lighting operational, light cord in reach

## 2023-12-30 LAB
CULTURE RESULTS: SIGNIFICANT CHANGE UP
CULTURE RESULTS: SIGNIFICANT CHANGE UP
SPECIMEN SOURCE: SIGNIFICANT CHANGE UP
SPECIMEN SOURCE: SIGNIFICANT CHANGE UP

## 2024-11-26 NOTE — ASU PREOP CHECKLIST - SURGICAL CONSENT
Gigi has to wait another 6 weeks till she gets her procedure done and missed her eye exam. That was rescheduled to May 2025  O...Seen with mom,  OT.  Meds, allergies and insurance checked.  See notes for details. Seen for admit. Provided and reviewed HC folder. Evaculation plan completed and in place. Reviewed meds and allergies, updated as needed in system.  Reviewed safe sleep and child safety precautions.  Guardians verbalized understanding and had no questions. Mom and grandmother present for evaluation/admit. See notes for remainder of evaluation.  [place] is well known to this therapist.  She has made slow and steady progress toward developmental milestones. She is now sitting more independently but does push herself backwards to return to supine and does not demonstrate any protective reflexes due to LOB.  She will roll to prone on her own but becomes upset when moved to prone.  She will scoot forward a small distance (<1ft) in prone to get toys but usually lays head to side and starts to cry.  She will continue to benefit from home PT to maximize functional mobility and to progress toward age appropriate developmental milestones.    P...Continue per POC.
done
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
## 2025-04-21 ENCOUNTER — NON-APPOINTMENT (OUTPATIENT)
Age: 45
End: 2025-04-21

## 2025-04-23 ENCOUNTER — APPOINTMENT (OUTPATIENT)
Dept: OBGYN | Facility: CLINIC | Age: 45
End: 2025-04-23

## (undated) DEVICE — SOL IRR BAG NS 0.9% 3000ML

## (undated) DEVICE — DRAPE 1/2 SHEET 40X57"

## (undated) DEVICE — GOWN LG

## (undated) DEVICE — TUBING ASPIRATION HI VAC LIPOSUCTION 8FT

## (undated) DEVICE — PACK LITHOTOMY

## (undated) DEVICE — GLV 7 PROTEXIS (WHITE)

## (undated) DEVICE — MEDICATION LABELS W MARKER

## (undated) DEVICE — DRSG DERMABOND 0.7ML

## (undated) DEVICE — SPECIMEN CONTAINER 100ML

## (undated) DEVICE — CONTAINER SPECIMEN 100ML

## (undated) DEVICE — PACK BREAST RECONSTRUCTION

## (undated) DEVICE — SUT POLYSORB 3-0 30" P-12 UNDYED

## (undated) DEVICE — SUT POLYSORB 2-0 30" V-20 UNDYED

## (undated) DEVICE — SYR LUER LOK 10CC

## (undated) DEVICE — SUT CHROMIC 5-0 18" P-13

## (undated) DEVICE — DRSG MAMMARY SUPPORT MED SIZE 2

## (undated) DEVICE — CONTAINER SPECIMEN PET

## (undated) DEVICE — WARMING BLANKET LOWER ADULT

## (undated) DEVICE — ELCTR BOVIE TIP BLADE VALLEYLAB 6.5"

## (undated) DEVICE — DRSG MASTISOL

## (undated) DEVICE — TUBING SUCTION 20FT

## (undated) DEVICE — POSITIONER FOAM EGG CRATE ULNAR 2PCS (PINK)

## (undated) DEVICE — BLADE SCALPEL SAFETYLOCK #10

## (undated) DEVICE — BLADE SCALPEL SAFETYLOCK #15

## (undated) DEVICE — DRSG XEROFORM 5 X 9"

## (undated) DEVICE — DRAPE IOBAN 23" X 23"

## (undated) DEVICE — SYR CATH TIP 2 OZ

## (undated) DEVICE — DRAPE MAYO STAND 30"

## (undated) DEVICE — SOL IRR POUR NS 0.9% 500ML

## (undated) DEVICE — POSITIONER STRAP ARMBOARD VELCRO TS-30 12

## (undated) DEVICE — DRAPE LIGHT HANDLE COVER FLEX GREEN

## (undated) DEVICE — DRSG MAMMARY SUPPORT MED SIZE 3

## (undated) DEVICE — TUBING INFILTRATION

## (undated) DEVICE — DRAIN JACKSON PRATT 10MM FLAT FULL NO TROCAR

## (undated) DEVICE — LAP PAD 18 X 18"

## (undated) DEVICE — DRAPE SHOWER CURTAIN ISOLATION

## (undated) DEVICE — ABDOMINAL BINDER MED/LG 12" X 45"-62"

## (undated) DEVICE — POSITIONER FOAM HEAD CRADLE

## (undated) DEVICE — SYMPHION 3.6MM RESECTING DEVICE

## (undated) DEVICE — SUT POLYSORB 3-0 30" V-20 UNDYED

## (undated) DEVICE — TUBING IRRIGATION SET FOR CYSTOSCOPY 77"

## (undated) DEVICE — DRSG MAMMARY SUPPORT SM SIZE 1

## (undated) DEVICE — WRAP COMPRESSION CALF MED

## (undated) DEVICE — MARKING PEN W RULER

## (undated) DEVICE — SUT POLYSORB 3-0 18" P-12 UNDYED

## (undated) DEVICE — STAPLER SKIN VISI-STAT 35 WIDE

## (undated) DEVICE — DRSG MAMMARY SUPPORT LG SIZE 4

## (undated) DEVICE — BAG DECANTER IV STERILE

## (undated) DEVICE — PREP BETADINE KIT

## (undated) DEVICE — SUT MONOSOF 5-0 18" P-13

## (undated) DEVICE — GLV 7 PROTEXIS

## (undated) DEVICE — DRSG CURITY GAUZE SPONGE 4 X 4" 12-PLY

## (undated) DEVICE — SPONGE LAP X-RAY DETECTABLE 18X18"

## (undated) DEVICE — DRAIN RESERVOIR FOR JACKSON PRATT 100CC CARDINAL

## (undated) DEVICE — BLANKET WARMER UPPER ADULT

## (undated) DEVICE — HTA PROCEDURE SET

## (undated) DEVICE — VENODYNE/SCD SLEEVE CALF MEDIUM

## (undated) DEVICE — DRSG COMBINE 5X9"

## (undated) DEVICE — DRAPE TOP SHEET 53"X101"

## (undated) DEVICE — SYMPHION FLUID MANAGEMENT DEVICE

## (undated) DEVICE — DRSG MAMMARY SUPPORT XL SIZE 5

## (undated) DEVICE — DRSG STERISTRIPS 0.5 X 4"

## (undated) DEVICE — PACK BASIN SPECIAL PROCEDURE

## (undated) DEVICE — SYR LUER LOK 20CC

## (undated) DEVICE — DRAPE INSTRUMENT POUCH 6.75" X 11"

## (undated) DEVICE — DRAPE TOWEL BLUE 17" X 24"

## (undated) DEVICE — SOL IRR POUR H2O 250ML